# Patient Record
Sex: MALE | Race: WHITE | Employment: OTHER | URBAN - METROPOLITAN AREA
[De-identification: names, ages, dates, MRNs, and addresses within clinical notes are randomized per-mention and may not be internally consistent; named-entity substitution may affect disease eponyms.]

---

## 2017-11-01 ENCOUNTER — HOSPITAL ENCOUNTER (OUTPATIENT)
Age: 52
Setting detail: OUTPATIENT SURGERY
Discharge: HOME | End: 2017-11-01
Attending: SURGERY | Admitting: SURGERY
Payer: COMMERCIAL

## 2017-11-01 VITALS
DIASTOLIC BLOOD PRESSURE: 89 MMHG | HEIGHT: 69 IN | BODY MASS INDEX: 27.56 KG/M2 | HEART RATE: 65 BPM | WEIGHT: 186.07 LBS | OXYGEN SATURATION: 98 % | TEMPERATURE: 96.8 F | SYSTOLIC BLOOD PRESSURE: 127 MMHG | RESPIRATION RATE: 16 BRPM

## 2017-11-01 PROBLEM — K40.90 LEFT INGUINAL HERNIA: Status: ACTIVE | Noted: 2017-11-01

## 2017-11-01 PROCEDURE — 6360000002 HC RX W HCPCS: Performed by: SURGERY

## 2017-11-01 PROCEDURE — 99153 MOD SED SAME PHYS/QHP EA: CPT | Performed by: SURGERY

## 2017-11-01 PROCEDURE — C1781 MESH (IMPLANTABLE): HCPCS | Performed by: SURGERY

## 2017-11-01 PROCEDURE — 3600000012 HC SURGERY LEVEL 2 ADDTL 15MIN: Performed by: SURGERY

## 2017-11-01 PROCEDURE — 3600000002 HC SURGERY LEVEL 2 BASE: Performed by: SURGERY

## 2017-11-01 PROCEDURE — 7100000010 HC PHASE II RECOVERY - FIRST 15 MIN: Performed by: SURGERY

## 2017-11-01 PROCEDURE — 2500000003 HC RX 250 WO HCPCS: Performed by: SURGERY

## 2017-11-01 PROCEDURE — 7100000011 HC PHASE II RECOVERY - ADDTL 15 MIN: Performed by: SURGERY

## 2017-11-01 PROCEDURE — 2580000003 HC RX 258: Performed by: SURGERY

## 2017-11-01 PROCEDURE — 99152 MOD SED SAME PHYS/QHP 5/>YRS: CPT | Performed by: SURGERY

## 2017-11-01 DEVICE — MESH SURG W4XL6IN STD FOR INGUINAL HERN REP PROLITE ULT: Type: IMPLANTABLE DEVICE | Site: GROIN | Status: FUNCTIONAL

## 2017-11-01 RX ORDER — OXYCODONE HYDROCHLORIDE AND ACETAMINOPHEN 5; 325 MG/1; MG/1
TABLET ORAL
Qty: 20 TABLET | Refills: 0
Start: 2017-11-01 | End: 2021-09-27

## 2017-11-01 RX ORDER — FENTANYL CITRATE 50 UG/ML
INJECTION, SOLUTION INTRAMUSCULAR; INTRAVENOUS PRN
Status: DISCONTINUED | OUTPATIENT
Start: 2017-11-01 | End: 2017-11-01 | Stop reason: HOSPADM

## 2017-11-01 RX ORDER — KETOROLAC TROMETHAMINE 15 MG/ML
15 INJECTION, SOLUTION INTRAMUSCULAR; INTRAVENOUS ONCE
Status: COMPLETED | OUTPATIENT
Start: 2017-11-01 | End: 2017-11-01

## 2017-11-01 RX ORDER — SODIUM CHLORIDE, SODIUM LACTATE, POTASSIUM CHLORIDE, CALCIUM CHLORIDE 600; 310; 30; 20 MG/100ML; MG/100ML; MG/100ML; MG/100ML
INJECTION, SOLUTION INTRAVENOUS CONTINUOUS
Status: DISCONTINUED | OUTPATIENT
Start: 2017-11-01 | End: 2017-11-01 | Stop reason: HOSPADM

## 2017-11-01 RX ORDER — MIDAZOLAM HYDROCHLORIDE 1 MG/ML
INJECTION INTRAMUSCULAR; INTRAVENOUS PRN
Status: DISCONTINUED | OUTPATIENT
Start: 2017-11-01 | End: 2017-11-01 | Stop reason: HOSPADM

## 2017-11-01 RX ORDER — M-VIT,TX,IRON,MINS/CALC/FOLIC 27MG-0.4MG
1 TABLET ORAL DAILY
COMMUNITY

## 2017-11-01 RX ADMIN — SODIUM CHLORIDE, POTASSIUM CHLORIDE, SODIUM LACTATE AND CALCIUM CHLORIDE: 600; 310; 30; 20 INJECTION, SOLUTION INTRAVENOUS at 06:49

## 2017-11-01 RX ADMIN — KETOROLAC TROMETHAMINE 15 MG: 15 INJECTION, SOLUTION INTRAMUSCULAR; INTRAVENOUS at 09:17

## 2017-11-01 RX ADMIN — CEFAZOLIN SODIUM 2 G: 2 SOLUTION INTRAVENOUS at 07:42

## 2017-11-01 ASSESSMENT — PAIN SCALES - GENERAL
PAINLEVEL_OUTOF10: 0

## 2017-11-01 NOTE — OP NOTE
sharply free from the vas and vessels and inverted back into the preperitoneal space without ligation or division. The surrounding preperitoneal fat was excised. The internal ring was estimated at 3 cm in size, admitting two fingers snugly. .  The floor of the canal was intact. A pre-peritoneal mesh repair was performed. Preparatory to this, the cord was skeletonized, dividing the cremasteric fascia with the cautery and the external spermatic vessels and the accompanying genital branch of the genitofemoral nerve between hemostats and ties with 3-0 Vicryl. The floor of the canal was opened from the inferior epigastric vessels down to the pubic tubercle sharply and the entire myopectineal orifice was then dissected working through the floor of the canal using a combination of blunt and sharp dissection and judicious use of the cautery for hemostasis. The iliohypogastric nerve was not identified. The repair was then performed by placing a 4 x 6 inch piece of ProLite Ultra Mesh into the preperitoneal space. This was anchored near the pubic tubercle with a mattress suture of 2-0 Prolene. Laterally, a slit was placed in this mesh and the 2 tails were passed around the cord and reapproximated with 2-0 PDS. This lateral portion of the mesh then was passed over the inferior epigastric vessels and back into the preperitoneal space through the internal ring. The mesh and the wound were then irrigated with saline solution containing gentamicin. The floor of the canal was  closed over the mesh after the manner of a Shouldice repair.    Starting with the 2-0 Prolene previously placed a running imbricating layer of transversalis fascia medially  was sutured to the iliopubic tract laterally, coming out to the internal ring until a tight closure had been performed, then reversing the suture and suturing the free edge that was thus created to the shelving portion of the inguinal ligament, running down to the pubic tubercle and tying. A 2nd suture of 2-0 Prolene was then started up near the internal ring, suturing the internal oblique to the shelving portion of the inguinal ligament, running down to the pubic tubercle, reversing the suture and coming back up to the internal ring and tying. The cord and ilioinguinal nerve were placed back into the canal and then after further irrigations with the saline and gentamicin solution, the external oblique was closed over the cord with running suture of 2-0 PDS. Peter's fascia was closed with interrupted sutures of 3-0 Monocryl and the skin was closed with a combination of interrupted subcuticular sutures of 4-0 Monocryl and dermal adhesive. Patient tolerated the procedure well and was transferred to the postoperative care area stable and in good condition with plans to be discharged to home later the same day. The patient did receive appropriate prophylactic antibiotics before the operation and does not require any after the operation. The patient was treated for VTE prophylaxis with IPC cuffs and does not require VTE prophylaxis after the operation.     Electronically signed by Yojana Conner MD on 11/1/2017 at 8:59 AM

## 2019-01-17 ENCOUNTER — CONSULT (OUTPATIENT)
Dept: CARDIOLOGY | Facility: CLINIC | Age: 54
End: 2019-01-17

## 2019-01-17 VITALS
HEART RATE: 69 BPM | BODY MASS INDEX: 26.22 KG/M2 | DIASTOLIC BLOOD PRESSURE: 81 MMHG | HEIGHT: 69 IN | WEIGHT: 177 LBS | SYSTOLIC BLOOD PRESSURE: 129 MMHG

## 2019-01-17 DIAGNOSIS — R55 NEAR SYNCOPE: ICD-10-CM

## 2019-01-17 DIAGNOSIS — R00.0 TACHYCARDIA, UNSPECIFIED: Primary | ICD-10-CM

## 2019-01-17 DIAGNOSIS — Z82.49 FAMILY HISTORY OF LONG QT SYNDROME: ICD-10-CM

## 2019-01-17 DIAGNOSIS — R00.0 TACHYCARDIA: Primary | ICD-10-CM

## 2019-01-17 PROCEDURE — 99204 OFFICE O/P NEW MOD 45 MIN: CPT | Performed by: INTERNAL MEDICINE

## 2019-01-17 PROCEDURE — 93000 ELECTROCARDIOGRAM COMPLETE: CPT | Performed by: INTERNAL MEDICINE

## 2019-01-17 RX ORDER — METOPROLOL SUCCINATE 25 MG/1
25 TABLET, EXTENDED RELEASE ORAL DAILY
Qty: 30 TABLET | Refills: 6 | Status: SHIPPED | OUTPATIENT
Start: 2019-01-17 | End: 2019-04-18

## 2019-01-17 NOTE — PROGRESS NOTES
Electrophysiology Consult     Rachid Jaramillo  1965  [unfilled]  [unfilled]    01/17/19    DATE OF ADMISSION: (Not on file)  [facility] CARDIOLOGY    Provider, No Known  [facility] [address]    Chief Complaint   Patient presents with   • Palpitations     Consult     Problem List:  1. Palpitations  a. 48 Hour Holter 6/25/18: 47 127 bpm, Average 77 bpm. Sinus Tachycarida 127 bpm. Rare PVCs. No symtpoms during monitor.   2. Family History of LQTS (sister)   3. Surgical History  a. inguinal hernia left repair          History of Present Illness:   53 year old WM with family history of LQTS in history sister who presents for consultation for palpitations. For the past 20 years, he has had episodes of palpitations occurring 4 times per year. More recently, he has had more frequent episodes. Episodes are described sudden onset tachycardia that lasts several minutes, leaving him feel weak several hours after. During the episodes, he just feels weak but no chest pain or SOB or syncope. He also has episodes of presyncope unrelated to his tachycardia. He feels lightheaded all of a sudden, no triggering factors, lasts a few seconds, does not actually pass out. He puts his head down and it passes on its own. He has been having these episodes for the multiple years, with approximately 6 episodes over the past year. Not related to exertion or being overheated. He does donate blood and these episodes do not occur. No history of cardiac testing. No LHC, echocardiogram, stress test. No genetic testing. Never been told his EKG had Long QT. No tobacco. No ETOH. One cup of coffee daily. No illegal drugs or stimulants.  He denies any active chest pain or shortness of breath during exertional activities.    No Known Allergies     Cannot display prior to admission medications because the patient has not been admitted in this contact.          No current outpatient medications on  "file.    Social History     Socioeconomic History   • Marital status:      Spouse name: Not on file   • Number of children: Not on file   • Years of education: Not on file   • Highest education level: Not on file   Tobacco Use   • Smoking status: Never Smoker   • Smokeless tobacco: Never Used   Substance and Sexual Activity   • Alcohol use: No     Frequency: Never   • Drug use: No   • Sexual activity: Defer       Sister: long QTc syndrome  Nephew: Long QTc syndrome        REVIEW OF SYSTEMS:   CONST:  No weight loss, fever, chills, weakness or + fatigue.   HEENT:  No visual loss, blurred vision, double vision, yellow sclerae.                   No hearing loss, congestion, sore throat.   SKIN:      No rashes, urticaria, ulcers, sores.     RESP:     No shortness of breath, hemoptysis, cough, sputum.   GI:           No anorexia, nausea, vomiting, diarrhea. No abdominal pain, melena.   :         No burning on urination, hematuria or increased frequency.  ENDO:    No diaphoresis, cold or heat intolerance. No polyuria or polydipsia.   NEURO:  No headache, + dizziness, +presyncope, - paralysis, ataxia, or parasthesias.                  No change in bowel or bladder control. No history of CVA/TIA  MUSC:    No muscle, back pain, joint pain or stiffness.   HEME:    No anemia, bleeding, bruising. No history of DVT/PE.  PSYCH:  No history of depression, anxiety    Vitals:    01/17/19 1336   BP: 129/81   BP Location: Left arm   Patient Position: Sitting   Pulse: 69   Weight: 80.3 kg (177 lb)   Height: 175.3 cm (69\")                 Physical Exam:  GEN: Well nourished, well-developed, no acute distress  HEENT: Normocephalic, atraumatic, PERRLA, moist mucous membranes  NECK: Supple, NO JVD, no thyromegaly, no lymphadenopathy  CARD: S1S2, RRR, no murmur, gallop, rub. PMI NL  LUNGS: Clear to auscultation, normal respiratory effort  ABDOMEN: Soft, nontender, normal bowel sounds  EXTREMITIES: No gross deformities, no clubbing, " cyanosis, or edema  SKIN: Warm, dry, intact, no lesions  NEURO: No focal deficits, alert and oriented x 3  PSYCHIATRIC: Normal affect and mood      I personally viewed and interpreted the patient's EKG/Telemetry/lab data      ECG 12 Lead  Date/Time: 1/17/2019 2:24 PM  Performed by: Anand Welsh MD  Authorized by: Anand Welsh MD   Comparison: compared with previous ECG   Comparison to previous ECG: QTc WNL  Rhythm: sinus rhythm  BPM: 79                ICD-10-CM ICD-9-CM   1. Tachycardia, unspecified R00.0 785.0   2. Family history of long QT syndrome Z82.49 V17.49   3. Near syncope R55 780.2       Assessment and Plan:     1. Palpitations:  - clinically, possibly consistent with SVT. 48 hour Holter does not show any significant arrhythmias, but pt did not have symptoms during monitor.  add Toprol XL 25 mg qhs.  If continues to have breakthrough episodes, will then consider a longer period of recording such as a ziopatch or MCOT monitor    2. Family History of LQTS:  - EKGs and telemetry Holter monitor strips reviewed show normal QT and QTC levels.  - It is unclear if genetic testing was family has been performed.  We will monitor for now based on his symptoms and QT intervals.    3. Presyncope: Does have a prodrome arm of lightheadedness prior to these episodes which may represent vasovagal syncope however will need echocardiogram to assess LV size and function and rule out any structural abnormalities.      Scribed for Anand Welsh MD by Anne Marie Kiran PA-C. 1/17/2019  2:28 PM     IAnand MD, personally performed the services described in this documentation as scribed by the above named individual in my presence, and it is both accurate and complete.  1/17/2019  2:38 PM

## 2019-04-18 ENCOUNTER — OFFICE VISIT (OUTPATIENT)
Dept: CARDIOLOGY | Facility: CLINIC | Age: 54
End: 2019-04-18

## 2019-04-18 VITALS
WEIGHT: 175 LBS | HEART RATE: 78 BPM | BODY MASS INDEX: 25.92 KG/M2 | HEIGHT: 69 IN | SYSTOLIC BLOOD PRESSURE: 108 MMHG | DIASTOLIC BLOOD PRESSURE: 78 MMHG

## 2019-04-18 DIAGNOSIS — R55 SYNCOPE, NEAR: ICD-10-CM

## 2019-04-18 DIAGNOSIS — R00.0 TACHYCARDIA, UNSPECIFIED: Primary | ICD-10-CM

## 2019-04-18 DIAGNOSIS — Z82.49 FAMILY HISTORY OF LONG QT SYNDROME: ICD-10-CM

## 2019-04-18 PROCEDURE — 93000 ELECTROCARDIOGRAM COMPLETE: CPT | Performed by: PHYSICIAN ASSISTANT

## 2019-04-18 PROCEDURE — 99213 OFFICE O/P EST LOW 20 MIN: CPT | Performed by: PHYSICIAN ASSISTANT

## 2019-04-18 NOTE — PROGRESS NOTES
"Rachid Vannesakevin  1965  [phone]    04/18/2019    [facility] CARDIOLOGY     Provider, No Known  [facility] [id number]    Chief Complaint   Patient presents with   • Rapid Heart Rate       Problem List:  1. Palpitations  a. 48 Hour Holter 6/25/18: 47 127 bpm, Average 77 bpm. Sinus Tachycarida 127 bpm. Rare PVCs. No symtpoms during monitor.   b. Echocardiogram 1/28/19: EF 60-65%, no valvular abnormalities  2. Family History of LQTS (sister)   3. Surgical History  a. inguinal hernia left repair    Allergies  No Known Allergies    Current Medications  No current outpatient medications on file.    History of Present Illness     Pt presents for follow up of palpitations, presyncope, and family history of LQTS.   Since we last saw the pt, he has not had any further episodes of palpitations or presyncope. He has not been taking his Metoprolol that Dr. Welsh prescribed at his last visit. He states he was hesitant to start taking it. He denies any CP or SOB. No hospitalizations or ER visits. He is active and doing well. He had his echocardiogram in January which showed normal EF. He has been doing well overall.     ROS:  General:  Denies fatigue, weight gain or loss  Cardiovascular:  Denies CP, PND, syncope, near syncope, edema or palpitations.  Pulmonary:  Denies MONTES, cough, or wheezing      Vitals:    04/18/19 1248   BP: 108/78   BP Location: Left arm   Patient Position: Sitting   Pulse: 78   Weight: 79.4 kg (175 lb)   Height: 175.3 cm (69\")     Body mass index is 25.84 kg/m².  PE:  General: NAD. A & O x 3  Neck: no JVD, no carotid bruits, no TM  Heart RRR, NL S1, S2, S4 present, no rubs, murmurs  Lungs: CTA, no wheezes, rhonchi, or rales  Abd: soft, non-tender, NL BS  Ext: No musculoskeletal deformities, no edema, cyanosis, or clubbing  Psych: normal mood and affect    Diagnostic Data:        ECG 12 Lead  Date/Time: 4/18/2019 1:19 PM  Performed by: Anne Marie Kiran" PA  Authorized by: Anne Marie Kiran PA   Comparison: compared with previous ECG from 1/17/2019  Comparison to previous ECG: QTc 460 ms now, compared with 440 ms on last EKG  Rhythm: sinus rhythm  BPM: 80              1. Tachycardia, unspecified    2. Family history of long QT syndrome    3. Syncope, near          Plan:  1. Palpitations:  - no further episodes. Consider MCOT if he has further episodes.     2. Family History of Long QT Syndrome:  - EKG shows QTc 460 ms today.  Will discuss with Dr. Welsh if he needs to pursue genetic testing at this time. I will call the patient back after discussion with Dr. Welsh.   - He should start taking Metoprolol 25 mg prescribed at last office visit.     3. Presyncope:  - no recurrences. Doing well.       F/up in 6 months

## 2019-04-24 ENCOUNTER — TELEPHONE (OUTPATIENT)
Dept: CARDIOLOGY | Facility: CLINIC | Age: 54
End: 2019-04-24

## 2019-04-24 ENCOUNTER — DOCUMENTATION (OUTPATIENT)
Dept: CARDIOLOGY | Facility: CLINIC | Age: 54
End: 2019-04-24

## 2019-04-24 NOTE — PROGRESS NOTES
Talked with Dr. Welsh about his EKG with borderline prolonged QTc. He recommends continuing his beta blocker and order genetic testing.

## 2019-04-24 NOTE — TELEPHONE ENCOUNTER
----- Message from DARCI Treviño sent at 4/24/2019  9:10 AM EDT -----  Regarding: genetic testing  Please call this patient and tell him that [facility] recommends genetic testing due to borderline prolonged QT and family history of long QT syndrome. Tell him to take his Lopressor that was prescribed as well.   Thank you!

## 2019-04-26 ENCOUNTER — TELEPHONE (OUTPATIENT)
Dept: CARDIOLOGY | Facility: CLINIC | Age: 54
End: 2019-04-26

## 2019-04-26 DIAGNOSIS — Z82.49 FH: LONG QT SYNDROME: Primary | ICD-10-CM

## 2019-05-08 ENCOUNTER — TELEPHONE (OUTPATIENT)
Dept: GENETICS | Facility: HOSPITAL | Age: 54
End: 2019-05-08

## 2019-05-29 ENCOUNTER — APPOINTMENT (OUTPATIENT)
Dept: LAB | Facility: HOSPITAL | Age: 54
End: 2019-05-29

## 2019-05-29 ENCOUNTER — CLINICAL SUPPORT (OUTPATIENT)
Dept: GENETICS | Facility: HOSPITAL | Age: 54
End: 2019-05-29

## 2019-05-29 DIAGNOSIS — Z87.898 HISTORY OF PALPITATIONS: ICD-10-CM

## 2019-05-29 DIAGNOSIS — Z13.79 GENETIC TESTING: Primary | ICD-10-CM

## 2019-05-29 DIAGNOSIS — Z82.41 FAMILY HISTORY OF SUDDEN CARDIAC DEATH: ICD-10-CM

## 2019-05-29 DIAGNOSIS — Z82.49 FAMILY HISTORY OF LONG QT SYNDROME: ICD-10-CM

## 2019-05-29 DIAGNOSIS — R94.31 PROLONGED QT INTERVAL: Primary | ICD-10-CM

## 2019-05-29 PROCEDURE — 96040: CPT | Performed by: GENETIC COUNSELOR, MS

## 2019-05-30 NOTE — PROGRESS NOTES
Rachid Jaramillo, a 54-year old male was seen for genetic counseling due to a personal history of borderline prolonged QT interval and family history of Long QT syndrome (LQTS). He has a history of palpitations and pre-syncope. He opted to pursue testing for LQTS. Genetic testing was ordered via the [facility] panel, which analyzes 64 genes associated with inherited arrhythmias including LQTS. Results are expected in 3-4 weeks.    CARDIAC FAMILY HISTORY: (See attached pedigree)     Sister:  LQTS, dx. early 30s  Brother: Borderline QT interval  Nephew: LQTS, dx. childhood  Niece:  LQTS, dx. childhood  Brother: Death from drowning [place] a lake at age 18    We do not have medical records regarding the diagnoses in the family.    RISK ASSESSMENT:   Mr. Jaramillo’s personal history of borderline prolonged QT interval and family history warranted genetic testing. Approximately 75% of individuals with Long QT syndrome have a mutation in one of the genes that was analyzed, therefore negative testing cannot completely rule out a diagnosis of Long QT syndrome.    GENETIC COUNSELING: (30 minutes) We began by discussing the features of LQTS.  A prolonged QT interval is most often due to inherited long QT syndrome (LQTS), but can also be induced by various drug exposures, hypokalemia (low potassium) and structural heart defects.  Determining the etiology greatly impacts the medical management and counseling regarding risk to the patient and their family members.  Long QT Syndrome (LQTS) is a common cardiac channelopathy, affecting approximately 1/5,000 individuals in the [place] with 500-1,000 new carriers born each year. Individuals with LQTS are at risk for syncopal events, ventricular fibrillation and aborted cardiac arrest (if the individual is defibrillated) or sudden death.  While there is no cure, proper medical management including combinations of lifestyle modification, â-blocker therapy and implantable  cardioverter-defibrillators (ICDs) can reduce the risks.    Familial LQTS is diagnosed with a combination of measurement of the QT interval on ECG and family history.  The most common type of familial LQTS, also called Ray-Self syndrome, is inherited in an autosomal dominant manner, meaning a single non-working copy of the gene is enough to cause the condition.  The gene may be passed from either the mother or father to either sons or daughters. Children of an individual who has Ray-Self syndrome have a 50% chance of inheriting the disease causing gene.  Familial LQTS displays incomplete penetrance with variable expressivity, meaning that not all individuals who inherit the disease causing gene in a family will display symptoms of the condition and that the symptoms can vary within a family.  Approximately 1/3 of individuals with familial LQTS remain asymptomatic.        A resting ECG is used to diagnose affected individuals within in a family; however, the QT interval on ECG can be indeterminate in 30% of affected individuals.  Additionally, a normal resting ECG has been observed in a few individuals (<<1%) that were known to have Ray-Self syndrome.  LQTS is caused by mutations in at least twelve different genes and clinical genetic testing is available.  Genetic testing identifies a mutation in approximately 75% of patients diagnosed with familial LQTS.  Familial LQTS syndrome demonstrates strong genotype/phenotype correlations, meaning the condition behaves differently depending on the gene responsible.  Genetic testing can help determine not only the risk of a cardiac event, but also the type of trigger that causes the cardiac event (loud noises and the startle response versus exercise versus sleep, etc.).  Additionally, the identification of a mutation would allow other family members to undergo informative genetic testing to assess their risk for LQTS.    GENETIC TESTING: A next generation sequencing  and deletion/duplication panel of 64 genes that predispose to arrhythmogenic right ventricular dysplasia (ARVD), Brugada syndrome, catecholaminergic polymorphic ventricular tachycardia (CPVT), long QT syndrome (LQTS), short QT syndrome (SQTS), and other arrhythmias/channelopathies was ordered. The features of these diseases and the associated genes overlap, therefore utilizing a comprehensive panel that includes relevant arrhythmia genes increases the likelihood of identifying a causative and informative genetic mutation.  The risks, benefits, and limitations of genetic testing and implications for clinical management following testing were reviewed. DNA test results can influence decisions regarding screening, prevention, and surgical management. Genetic testing can have significantly psychological implications for both individuals and families. Also discussed was the possibility of employment and insurance discrimination based on genetic test results and the laws in place to prevent this ([place]). We discussed panel testing and the possibility that, in some cases, genetic test results may be ambiguous due to the identification of a genetic variant.    PLAN:  Results will be available in 3-4 weeks, and Mr. Jaramillo will be contacted at that time.  We encourage Mr. Jaramillo to contact us in the meantime if he has any questions or concerns.        Jadyn Rivera MS, [facility], [facility]  Licensed Certified Genetic Counselor

## 2019-06-18 ENCOUNTER — DOCUMENTATION (OUTPATIENT)
Dept: GENETICS | Facility: HOSPITAL | Age: 54
End: 2019-06-18

## 2019-06-18 NOTE — PROGRESS NOTES
Rachid Jaramillo, a 54-year old male was seen for genetic counseling due to a personal history of borderline prolonged QT interval and family history of Long QT syndrome (LQTS). He has a history of palpitations and pre-syncope. He opted to pursue testing for LQTS. Genetic testing was ordered via the [employer] panel, which analyzes 64 genes associated with inherited arrhythmias including LQTS. 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   These results were discussed with Mr. Jaramillo by telephone on 6/18/19.      CARDIAC FAMILY HISTORY: (See attached pedigree)     Sister:  LQTS, dx. early 30s  Brother: Borderline QT interval  Nephew: LQTS, dx. childhood  Niece:  LQTS, dx. childhood  Brother: Death from drowning [place] a lake at age 18    We do not have medical records regarding the diagnoses in the family.    RISK ASSESSMENT:   Mr. Jaramillo’s personal history of borderline prolonged QT interval and family history warranted genetic testing. Approximately 75% of individuals with Long QT syndrome have a mutation in one of the genes that was analyzed, therefore negative testing cannot completely rule out a diagnosis of Long QT syndrome.    GENETIC COUNSELING:  We began by discussing the features of LQTS.  A prolonged QT interval is most often due to inherited long QT syndrome (LQTS), but can also be induced by various drug exposures, hypokalemia (low potassium) and structural heart defects.  Determining the etiology greatly impacts the medical management and counseling regarding risk to the patient and their family members.  Long QT Syndrome (LQTS) is a common cardiac channelopathy, affecting approximately 1/5,000 individuals in the [place] with 500-1,000 new carriers born each year. Individuals with LQTS are at risk for syncopal events, ventricular fibrillation and aborted cardiac arrest (if the individual is defibrillated) or sudden death.  While there is no  cure, proper medical management including combinations of lifestyle modification, â-blocker therapy and implantable cardioverter-defibrillators (ICDs) can reduce the risks.    Familial LQTS is diagnosed with a combination of measurement of the QT interval on ECG and family history.  The most common type of familial LQTS, also called Ray-Self syndrome, is inherited in an autosomal dominant manner, meaning a single non-working copy of the gene is enough to cause the condition.  The gene may be passed from either the mother or father to either sons or daughters. Children of an individual who has Ray-Self syndrome have a 50% chance of inheriting the disease causing gene.  Familial LQTS displays incomplete penetrance with variable expressivity, meaning that not all individuals who inherit the disease causing gene in a family will display symptoms of the condition and that the symptoms can vary within a family.  Approximately 1/3 of individuals with familial LQTS remain asymptomatic.        A resting ECG is used to diagnose affected individuals within a family; however, the QT interval on ECG can be indeterminate in 30% of affected individuals.  Additionally, a normal resting ECG has been observed in a few individuals (<<1%) that were known to have Ray-Self syndrome.  LQTS is caused by mutations in at least twelve different genes and clinical genetic testing is available.  Genetic testing identifies a mutation in approximately 75% of patients diagnosed with familial LQTS.  Familial LQTS syndrome demonstrates strong genotype/phenotype correlations, meaning the condition behaves differently depending on the gene responsible.  Genetic testing can help determine not only the risk of a cardiac event, but also the type of trigger that causes the cardiac event (loud noises and the startle response versus exercise versus sleep, etc.).  Additionally, the identification of a mutation would allow other family members to  undergo informative genetic testing to assess their risk for LQTS.    GENETIC TESTING: A next generation sequencing and deletion/duplication panel of 64 genes that predispose to arrhythmogenic right ventricular dysplasia (ARVD), Brugada syndrome, catecholaminergic polymorphic ventricular tachycardia (CPVT), long QT syndrome (LQTS), short QT syndrome (SQTS), and other arrhythmias/channelopathies was ordered. The features of these diseases and the associated genes overlap; therefore utilizing a comprehensive panel that includes relevant arrhythmia genes increases the likelihood of identifying a causative and informative genetic mutation.  The risks, benefits, and limitations of genetic testing and implications for clinical management following testing were reviewed. DNA test results can influence decisions regarding screening, prevention, and surgical management. Genetic testing can have significantly psychological implications for both individuals and families. Also discussed was the possibility of employment and insurance discrimination based on genetic test results and the laws in place to prevent this (EZEQUIEL), as well as the limitations of these laws. We discussed panel testing and the possibility that, in some cases, genetic test results may be ambiguous due to the identification of a genetic variant of uncertain significance.     TEST RESULTS:  A pathogenic mutation was identified in the KCNQ1 gene, which is causative for Long QT Syndrome Type 1. Based on the family history, it is unclear from which side of the family this mutation was inherited. Mr. Jaramillo’s children and siblings each have a 50% chance to have inherited this mutation and be at increased risk, therefore genetic testing is warranted for at-risk family members.     At-risk family members should be evaluated by cardiology, unless they have been proven to not carry this KCNQ1 mutation. We would be happy to see family members who live in the [place]  area in our clinic to further discuss this information and testing options. They can call our office at [phone] to schedule an appointment. For family members who live elsewhere, there are genetic counselors at most [facility] centers. They can find a genetic counselor by visiting the American Board of Genetic Counseling website at www.abgc.net or they can call our office and we would be happy to give them the contact information of the closest genetic counselor.  Mr. Jaramillo would need to provide his family members with a copy of his test results to ensure the correct testing is ordered. [facility], the laboratory that testing was completed through, offers single site testing for the known familial mutation to close relatives within 90 days of Mr. Jaramillo’s result report at no cost to the patient (testing by 9/11/19).     PLAN: Genetic counseling remains available to Mr. Jaramillo and his family. He will follow up with Dr. Welsh to establish a plan for management. If Mr. Jaramillo has any questions or concerns, he is welcome to contact us at [phone].      Jadyn Rivera MS, [facility] [place], [facility]  Licensed Certified Genetic Counselor       Cc: MD Rachid Mendez

## 2019-06-21 ENCOUNTER — TELEPHONE (OUTPATIENT)
Dept: CARDIOLOGY | Facility: CLINIC | Age: 54
End: 2019-06-21

## 2019-06-21 RX ORDER — NADOLOL 40 MG/1
40 TABLET ORAL DAILY
Qty: 30 TABLET | Refills: 11 | Status: SHIPPED | OUTPATIENT
Start: 2019-06-21 | End: 2020-07-01 | Stop reason: SDUPTHER

## 2019-06-21 NOTE — TELEPHONE ENCOUNTER
----- Message from Anand Welsh MD sent at 6/20/2019  6:29 PM EDT -----   He tested positive for long QT syndrome 1.  Please call him and make sure that he knows that he needs to be aggressive with any fever to get down as quickly as possible.  Also give him the website [email or web address] so that he knows any drugs that may worsen his QT interval.  Also I would like to stop his Toprol and initiate naldolol 40 mg po daily    GT    ----- Message -----  From: Jadyn Rivera  Sent: 6/20/2019  12:54 PM  To: Anand Welsh MD

## 2019-06-21 NOTE — TELEPHONE ENCOUNTER
Pt called back, I explained about quickly reducing a fever, gave him the website to check any medications and any potential new medications, and switching from toprol to nadolol.  Verbal understanding.

## 2019-08-15 ENCOUNTER — OFFICE VISIT (OUTPATIENT)
Dept: CARDIOLOGY | Facility: CLINIC | Age: 54
End: 2019-08-15

## 2019-08-15 VITALS
DIASTOLIC BLOOD PRESSURE: 72 MMHG | SYSTOLIC BLOOD PRESSURE: 102 MMHG | BODY MASS INDEX: 26.66 KG/M2 | HEART RATE: 58 BPM | WEIGHT: 180 LBS | HEIGHT: 69 IN

## 2019-08-15 DIAGNOSIS — R42 POSTURAL DIZZINESS WITH PRESYNCOPE: Primary | ICD-10-CM

## 2019-08-15 DIAGNOSIS — R55 POSTURAL DIZZINESS WITH PRESYNCOPE: Primary | ICD-10-CM

## 2019-08-15 DIAGNOSIS — I45.81 LONG Q-T SYNDROME: Primary | ICD-10-CM

## 2019-08-15 PROCEDURE — 93000 ELECTROCARDIOGRAM COMPLETE: CPT | Performed by: PHYSICIAN ASSISTANT

## 2019-08-15 PROCEDURE — 99213 OFFICE O/P EST LOW 20 MIN: CPT | Performed by: PHYSICIAN ASSISTANT

## 2019-08-15 NOTE — PROGRESS NOTES
Rachid Jaramillo  1965  [phone]    08/15/2019    [facility] CARDIOLOGY     Provider, No Known  [facility] [address]    Chief Complaint   Patient presents with   • Rapid Heart Rate       Problem List:  1. Long QT Syndrome  a. 48 Hour Holter 6/25/18: 47 127 bpm, Average 77 bpm. Sinus Tachycarida 127 bpm. Rare PVCs. No symtpoms during monitor.   b. Echocardiogram 1/28/19: EF 60-65%, no valvular abnormalities  c. Positive Genetic Testing for LQTS Type 1  2. Family History of LQTS (sister)   3. Surgical History  a. inguinal hernia left repair        Allergies  No Known Allergies    Current Medications    Current Outpatient Medications:   •  nadolol (CORGARD) 40 MG tablet, Take 1 tablet by mouth Daily. AT BEDTIME, Disp: 30 tablet, Rfl: 11    History of Present Illness     Pt presents for follow up of long QT syndrome. Since we last saw the pt, tested positive on QT syndrome type I.  Dr. Welsh recommended nadolol, which he is taking.  Since being on the nadolol he has had recurrent episodes of presyncope 1-2 times per month which he describes as a feeling of lightheadedness and blackened vision with near syncope.  He has not had a full syncope episode.  He states there are no triggering factors and usually this occurs while sitting.  He does not feel any tachypalpitations shortness of breath or chest pain during or after these events.  He has had these episodes for many years, but notes they are worse since starting nadolol.  Overall he feels he is doing well and denies any chest pain, shortness of breath, palpitations.  Denies any hospitalizations, ER visits, bleeding, or TIA/CVA symptoms. Overall feels well.  He states that since he tested positive, now his daughter and several of her cousins have been tested all of which have been positive aside from one family member.    ROS:  General:  Denies fatigue, weight gain or loss  Cardiovascular:  Denies CP, PND,  "syncope, + near syncope, edema or palpitations.  Pulmonary:  Denies MONTES, cough, or wheezing      Vitals:    08/15/19 1233   BP: 102/72   BP Location: Left arm   Patient Position: Sitting   Pulse: 58   Weight: 81.6 kg (180 lb)   Height: 175.3 cm (69\")     Body mass index is 26.58 kg/m².  PE:  General: NAD  Neck: no JVD, no carotid bruits, no TM  Heart RRR, NL S1, S2, S4 present, no rubs, murmurs  Lungs: CTA, no wheezes, rhonchi, or rales  Abd: soft, non-tender, NL BS  Ext: No musculoskeletal deformities, no edema, cyanosis, or clubbing  Psych: normal mood and affect    Diagnostic Data:        ECG 12 Lead  Date/Time: 8/15/2019 1:00 PM  Performed by: Anne Marie Kiran PA  Authorized by: Anne Marie Kiran PA   Comparison: compared with previous ECG from 4/18/2019  Similar to previous ECG  Comparison to previous ECG: QTc has shortened  Rhythm: sinus rhythm  BPM: 57              1. Long Q-T syndrome          Plan:    1.  Long QT syndrome:  -Genetic testing for long QT syndrome type I.  Currently on nadolol 40 mg daily, he was told to continue this.  - symptoms of presyncope but not full syncope.  Presyncope symptoms are somewhat concerning in light of his diagnosis of long QT syndrome, however could be related to vasovagal presyncope as he said it has worsened since being on nadolol with lower blood pressures.  However, I would like for him to wear an MCOT to rule out ventricular arrhythmias.     F/up in 6 months    Electronically signed by DARCI Choudhary, 08/15/19, 1:03 PM.    "

## 2019-08-22 ENCOUNTER — TELEPHONE (OUTPATIENT)
Dept: CARDIOLOGY | Facility: CLINIC | Age: 54
End: 2019-08-22

## 2019-08-22 NOTE — TELEPHONE ENCOUNTER
Left message for patient to call us back as he did not answer. Discussed plan from my office visit with Mr. Jaramillo last week with Dr. Welsh, and he agrees to proceed with MCOT at this time. No other recommendations or changes in medications at this time.

## 2019-10-18 ENCOUNTER — TELEPHONE (OUTPATIENT)
Dept: CARDIOLOGY | Facility: CLINIC | Age: 54
End: 2019-10-18

## 2019-10-18 NOTE — TELEPHONE ENCOUNTER
I called pt to let him know that his monitor results were normal (no AF, no VT/VF) and that no changes were needed at this time per Anne Marie Kiran PA-C.

## 2020-02-20 ENCOUNTER — OFFICE VISIT (OUTPATIENT)
Dept: CARDIOLOGY | Facility: CLINIC | Age: 55
End: 2020-02-20

## 2020-02-20 VITALS
DIASTOLIC BLOOD PRESSURE: 72 MMHG | WEIGHT: 180 LBS | HEIGHT: 69 IN | BODY MASS INDEX: 26.66 KG/M2 | HEART RATE: 58 BPM | SYSTOLIC BLOOD PRESSURE: 108 MMHG

## 2020-02-20 DIAGNOSIS — I45.81 LONG Q-T SYNDROME: Primary | ICD-10-CM

## 2020-02-20 PROCEDURE — 99213 OFFICE O/P EST LOW 20 MIN: CPT | Performed by: INTERNAL MEDICINE

## 2020-02-20 PROCEDURE — 93000 ELECTROCARDIOGRAM COMPLETE: CPT | Performed by: INTERNAL MEDICINE

## 2020-07-01 RX ORDER — NADOLOL 40 MG/1
40 TABLET ORAL DAILY
Qty: 30 TABLET | Refills: 11 | Status: SHIPPED | OUTPATIENT
Start: 2020-07-01 | End: 2021-03-18 | Stop reason: SDUPTHER

## 2021-03-18 ENCOUNTER — OFFICE VISIT (OUTPATIENT)
Dept: CARDIOLOGY | Facility: CLINIC | Age: 56
End: 2021-03-18

## 2021-03-18 VITALS
DIASTOLIC BLOOD PRESSURE: 64 MMHG | HEIGHT: 69 IN | OXYGEN SATURATION: 96 % | SYSTOLIC BLOOD PRESSURE: 110 MMHG | WEIGHT: 180 LBS | HEART RATE: 54 BPM | BODY MASS INDEX: 26.66 KG/M2

## 2021-03-18 DIAGNOSIS — Z82.49 FAMILY HISTORY OF LONG QT SYNDROME: ICD-10-CM

## 2021-03-18 DIAGNOSIS — I45.81 LONG Q-T SYNDROME: Primary | ICD-10-CM

## 2021-03-18 PROCEDURE — 93000 ELECTROCARDIOGRAM COMPLETE: CPT | Performed by: INTERNAL MEDICINE

## 2021-03-18 PROCEDURE — 99213 OFFICE O/P EST LOW 20 MIN: CPT | Performed by: INTERNAL MEDICINE

## 2021-03-18 RX ORDER — NADOLOL 40 MG/1
40 TABLET ORAL DAILY
Qty: 90 TABLET | Refills: 3 | Status: SHIPPED | OUTPATIENT
Start: 2021-03-18 | End: 2022-04-08

## 2021-03-18 NOTE — PROGRESS NOTES
"Rachid KEARNS Vannesakevin  1965  [phone]    03/18/2021    [facility] CARDIOLOGY         Merle Segura MD  [address] [facility] [address]    Chief Complaint   Patient presents with   • Long QT Syndrome       Problem List:     1. Long QT Syndrome  a. 48 Hour Holter 6/25/18: 47 127 bpm, Average 77 bpm. Sinus Tachycarida 127 bpm. Rare PVCs. No symtpoms during monitor.   b. Echocardiogram 1/28/19: EF 60-65%, no valvular abnormalities  c. Positive Genetic Testing for LQTS Type 1  d. MCOT 10/2019: no atrial fibrillation or VT/VF  2. Family History of LQTS (sister)   3. Surgical History  a. inguinal hernia left repair    Allergies  No Known Allergies    Current Medications    Current Outpatient Medications:   •  nadolol (Corgard) 40 MG tablet, Take 1 tablet by mouth Daily. AT BEDTIME, Disp: 90 tablet, Rfl: 3    History of Present Illness:     Pt presents for follow up of. Since we last saw the pt, he has been doing well.  He denies SOB, CP, syncope. No palpitations. He has occasional presyncopal spells that are mild.  Denies any hospitalizations, ER visits, bleeding issues, or TIA/CVA symptoms. Overall feels well.     ROS:  General:  Denies fatigue, weight gain or loss  Cardiovascular:  Denies CP, PND, syncope, near syncope, edema or palpitations. + dizziness.   Pulmonary:  Denies MONTES, cough, or wheezing      Vitals:    03/18/21 1042   BP: 110/64   BP Location: Right arm   Patient Position: Sitting   Pulse: 54   SpO2: 96%   Weight: 81.6 kg (180 lb)   Height: 175.3 cm (69\")     Body mass index is 26.58 kg/m².  PE:  General: NAD  Neck: no JVD, no carotid bruits, no TM  Heart RRR, NL S1, S2, S4 present, no rubs, murmurs  Lungs: CTA, no wheezes, rhonchi, or rales  Abd: soft, non-tender, NL BS  Ext: No musculoskeletal deformities, no edema, cyanosis, or clubbing  Psych: normal mood and affect    Diagnostic Data:        ECG 12 Lead    Date/Time: 3/18/2021 10:56 AM  Performed by: Anand Welsh" MD  Authorized by: Anand Welsh MD   Comparison: compared with previous ECG from 2/20/2020  Similar to previous ECG  Rhythm: sinus rhythm  BPM: 58              1. Long Q-T syndrome    2. Family history of long QT syndrome          Plan:  1.  Long QT syndrome: QTC today measured at 440 ms.  Asymptomatic and  doing well on Nadolol     F/up in 12 months    Scribed for Anand Welsh MD by Anne Marie Kiran PA-C. 3/18/2021  11:08 EDT     I, Anand Welsh MD, personally performed the services described in this documentation as scribed by the above named individual in my presence, and it is both accurate and complete.  3/18/2021  11:08 EDT

## 2021-09-27 ENCOUNTER — OFFICE VISIT (OUTPATIENT)
Dept: FAMILY MEDICINE CLINIC | Age: 56
End: 2021-09-27
Payer: COMMERCIAL

## 2021-09-27 VITALS
DIASTOLIC BLOOD PRESSURE: 68 MMHG | WEIGHT: 189 LBS | OXYGEN SATURATION: 95 % | SYSTOLIC BLOOD PRESSURE: 100 MMHG | HEIGHT: 67 IN | HEART RATE: 67 BPM | BODY MASS INDEX: 29.66 KG/M2

## 2021-09-27 DIAGNOSIS — I45.81 PROLONGED QT INTERVAL SYNDROME: ICD-10-CM

## 2021-09-27 DIAGNOSIS — Z76.89 ENCOUNTER TO ESTABLISH CARE: Primary | ICD-10-CM

## 2021-09-27 DIAGNOSIS — N48.30 PAINFUL PENILE ERECTION: ICD-10-CM

## 2021-09-27 PROCEDURE — 36415 COLL VENOUS BLD VENIPUNCTURE: CPT

## 2021-09-27 PROCEDURE — 99203 OFFICE O/P NEW LOW 30 MIN: CPT

## 2021-09-27 RX ORDER — NADOLOL 40 MG/1
40 TABLET ORAL DAILY
COMMUNITY
Start: 2021-03-18

## 2021-09-27 ASSESSMENT — PATIENT HEALTH QUESTIONNAIRE - PHQ9
SUM OF ALL RESPONSES TO PHQ QUESTIONS 1-9: 0
SUM OF ALL RESPONSES TO PHQ9 QUESTIONS 1 & 2: 0
2. FEELING DOWN, DEPRESSED OR HOPELESS: 0
SUM OF ALL RESPONSES TO PHQ QUESTIONS 1-9: 0
1. LITTLE INTEREST OR PLEASURE IN DOING THINGS: 0
SUM OF ALL RESPONSES TO PHQ QUESTIONS 1-9: 0

## 2021-09-27 ASSESSMENT — ENCOUNTER SYMPTOMS
WHEEZING: 0
TROUBLE SWALLOWING: 0
SORE THROAT: 0
CHEST TIGHTNESS: 0
CONSTIPATION: 0
COLOR CHANGE: 0
RHINORRHEA: 0
EYE DISCHARGE: 0
CHOKING: 0
COUGH: 0
ABDOMINAL DISTENTION: 0
SHORTNESS OF BREATH: 0
DIARRHEA: 0
EYE PAIN: 0
ABDOMINAL PAIN: 0

## 2021-09-27 NOTE — PROGRESS NOTES
New Patient      Katelyn Watts  YOB: 1965    Date of Service:  9/27/2021    Chief Complaint:   Katelyn Watts is a 61 y.o. female who presents for   Chief Complaint   Patient presents with   [facility] Established New Doctor        HPI: Here to establish care with this provider. Previous PCP was Gonzalo Mane at [facility]. Sees Mary Ann Roach (cardiologist in [place]). States his next jasmyne is March of 2022. Pt also here today because he is concerned about his Penis. He states in the last month his penis has had a significant change in curvature while erect. Pt states he has always has some mild curvature. Now states he has a 45 degree or greater curvature while erect. States that having sex has become uncomfortable for himself as well as his wife. Denies having any trouble with urination. Denies any pain with orgasm. Advance Directive: N, <no information>      There is no immunization history on file for this patient.     No Known Allergies    Outpatient Medications Marked as Taking for the 9/27/21 encounter (Office Visit) with ТАТЬЯНА Prater CNP   Medication Sig Dispense Refill    nadolol (CORGARD) 40 MG tablet Take 40 mg by mouth daily      Multiple Vitamins-Minerals (THERAPEUTIC MULTIVITAMIN-MINERALS) tablet Take 1 tablet by mouth daily      NONFORMULARY daily Herbal for Prostate Health         Past Medical History:   Diagnosis Date    Heart palpitations     Long Q-T syndrome     Measles     Mumps        Past Surgical History:   Procedure Laterality Date    HERNIA REPAIR Left 11/1/2017    HERNIA INGUINAL REPAIR with mesh performed by Kiana Hoffman MD at [address] [place] [address] Left 11/01/2017       Family History   Problem Relation Age of Onset    No Known Problems Mother     Heart Attack Father     Depression Sister     No Known Problems Brother     Cancer Maternal Grandmother     Cancer Maternal Grandfather     Cancer Paternal Grandmother     Diabetes Paternal Grandfather     Other Sister     Depression Sister     Other Brother     No Known Problems Brother     No Known Problems Brother     No Known Problems Brother     No Known Problems Brother     Depression Brother     No Known Problems Brother        Social History     Socioeconomic History    Marital status:      Spouse name: Not on file    Number of children: Not on file    Years of education: Not on file    Highest education level: Not on file   Occupational History    Not on file   Tobacco Use    Smoking status: Never Smoker    Smokeless tobacco: Never Used   Substance and Sexual Activity    Alcohol use: No    Drug use: Yes    Sexual activity: Not on file   Other Topics Concern    Not on file   Social History Narrative    Not on file     Social Determinants of Health     Financial Resource Strain:     Difficulty of Paying Living Expenses:    Food Insecurity:     Worried About Running Out of Food in the Last Year:     [address] in the Last Year:    Transportation Needs:     Lack of Transportation (Medical):  Lack of Transportation (Non-Medical):    Physical Activity:     Days of Exercise per Week:     Minutes of Exercise per Session:    Stress:     Feeling of Stress :    Social Connections:     Frequency of Communication with Friends and Family:     Frequency of Social Gatherings with Friends and Family:     Attends [employer] Services:     Active Member of Clubs or Organizations:     Attends Club or Organization Meetings:     Marital Status:    Intimate Partner Violence:     Fear of Current or Ex-Partner:     Emotionally Abused:     Physically Abused:     Sexually Abused:        Review ofSystems:  Review of Systems   Constitutional: Negative for activity change, appetite change, chills, fatigue, fever and unexpected weight change. HENT: Negative for rhinorrhea, sore throat and trouble swallowing.     Eyes: Negative for pain, discharge and visual disturbance. Respiratory: Negative for cough, choking, chest tightness, shortness of breath and wheezing. Cardiovascular: Negative for chest pain, palpitations and leg swelling. Gastrointestinal: Negative for abdominal distention, abdominal pain, constipation and diarrhea. Endocrine: Negative for cold intolerance and heat intolerance. Genitourinary: Positive for penile pain. Negative for difficulty urinating, discharge, dysuria, frequency, penile swelling, scrotal swelling and testicular pain. Penile discomfort while erect having sex   Musculoskeletal: Negative for gait problem and neck stiffness. Skin: Negative for color change and rash. Neurological: Negative for dizziness, weakness and headaches. Psychiatric/Behavioral: Negative for dysphoric mood and sleep disturbance. Physical Exam:   /68   Pulse 67   Ht 5' 7\" (1.702 m)   Wt 189 lb (85.7 kg)   SpO2 95%   BMI 29.60 kg/m²     Physical Exam  Constitutional:       Appearance: Normal appearance. HENT:      Head: Normocephalic and atraumatic. Right Ear: External ear normal.      Left Ear: External ear normal.      Nose: Nose normal. No congestion. Mouth/Throat:      Mouth: Mucous membranes are moist.      Pharynx: Oropharynx is clear. Eyes:      Extraocular Movements: Extraocular movements intact. Pupils: Pupils are equal, round, and reactive to light. Cardiovascular:      Rate and Rhythm: Normal rate and regular rhythm. Pulses: Normal pulses. Heart sounds: Normal heart sounds. No murmur heard. Pulmonary:      Effort: Pulmonary effort is normal. No respiratory distress. Breath sounds: Normal breath sounds. No wheezing. Abdominal:      General: Bowel sounds are normal.      Palpations: Abdomen is soft. Tenderness: There is no abdominal tenderness. Genitourinary:     Penis: Normal.       Testes: Normal.   Musculoskeletal:         General: Normal range of motion. Cervical back: Normal range of motion and neck supple. Right lower leg: No edema. Left lower leg: No edema. Skin:     General: Skin is warm and dry. Capillary Refill: Capillary refill takes less than 2 seconds. Findings: No rash. Neurological:      General: No focal deficit present. Mental Status: He is alert and oriented to person, place, and time. Motor: No weakness. Psychiatric:         Mood and Affect: Mood normal.         Behavior: Behavior normal.         Assessment/Plan:  1. Encounter to establish care  To establish care with this provider. Wanted to see a different PCP.  - Lipid Panel  - CBC Auto Differential    2. Painful penile erection  Having pain while erect having sex  - Cherylene Lips, MD, Urology, [facility]    3.  Prolonged QT interval syndrome    - COMPREHENSIVE METABOLIC PANEL

## 2021-09-28 LAB
A/G RATIO: 1.7 (ref 1.1–2.2)
ALBUMIN SERPL-MCNC: 4.3 G/DL (ref 3.4–5)
ALP BLD-CCNC: 77 U/L (ref 40–129)
ALT SERPL-CCNC: 33 U/L (ref 10–40)
ANION GAP SERPL CALCULATED.3IONS-SCNC: 13 MMOL/L (ref 3–16)
AST SERPL-CCNC: 20 U/L (ref 15–37)
BASOPHILS ABSOLUTE: 0.1 K/UL (ref 0–0.2)
BASOPHILS RELATIVE PERCENT: 0.9 %
BILIRUB SERPL-MCNC: 0.9 MG/DL (ref 0–1)
BUN BLDV-MCNC: 15 MG/DL (ref 7–20)
CALCIUM SERPL-MCNC: 9.5 MG/DL (ref 8.3–10.6)
CHLORIDE BLD-SCNC: 104 MMOL/L (ref 99–110)
CHOLESTEROL, TOTAL: 214 MG/DL (ref 0–199)
CO2: 21 MMOL/L (ref 21–32)
CREAT SERPL-MCNC: 0.8 MG/DL (ref 0.9–1.3)
EOSINOPHILS ABSOLUTE: 0.3 K/UL (ref 0–0.6)
EOSINOPHILS RELATIVE PERCENT: 5.1 %
GFR AFRICAN AMERICAN: >60
GFR NON-AFRICAN AMERICAN: >60
GLOBULIN: 2.6 G/DL
GLUCOSE BLD-MCNC: 105 MG/DL (ref 70–99)
HCT VFR BLD CALC: 45.6 % (ref 40.5–52.5)
HDLC SERPL-MCNC: 34 MG/DL (ref 40–60)
HEMOGLOBIN: 15.4 G/DL (ref 13.5–17.5)
LDL CHOLESTEROL CALCULATED: 135 MG/DL
LYMPHOCYTES ABSOLUTE: 1.7 K/UL (ref 1–5.1)
LYMPHOCYTES RELATIVE PERCENT: 30.4 %
MCH RBC QN AUTO: 28.1 PG (ref 26–34)
MCHC RBC AUTO-ENTMCNC: 33.8 G/DL (ref 31–36)
MCV RBC AUTO: 83.3 FL (ref 80–100)
MONOCYTES ABSOLUTE: 0.7 K/UL (ref 0–1.3)
MONOCYTES RELATIVE PERCENT: 12.8 %
NEUTROPHILS ABSOLUTE: 2.8 K/UL (ref 1.7–7.7)
NEUTROPHILS RELATIVE PERCENT: 50.8 %
PDW BLD-RTO: 13.9 % (ref 12.4–15.4)
PLATELET # BLD: 217 K/UL (ref 135–450)
PMV BLD AUTO: 8.8 FL (ref 5–10.5)
POTASSIUM SERPL-SCNC: 4.2 MMOL/L (ref 3.5–5.1)
RBC # BLD: 5.47 M/UL (ref 4.2–5.9)
SODIUM BLD-SCNC: 138 MMOL/L (ref 136–145)
TOTAL PROTEIN: 6.9 G/DL (ref 6.4–8.2)
TRIGL SERPL-MCNC: 223 MG/DL (ref 0–150)
VLDLC SERPL CALC-MCNC: 45 MG/DL
WBC # BLD: 5.6 K/UL (ref 4–11)

## 2022-01-03 ENCOUNTER — OFFICE VISIT (OUTPATIENT)
Dept: FAMILY MEDICINE CLINIC | Age: 57
End: 2022-01-03
Payer: COMMERCIAL

## 2022-01-03 ENCOUNTER — TELEPHONE (OUTPATIENT)
Dept: FAMILY MEDICINE CLINIC | Age: 57
End: 2022-01-03

## 2022-01-03 VITALS
WEIGHT: 190.4 LBS | BODY MASS INDEX: 29.88 KG/M2 | OXYGEN SATURATION: 97 % | HEART RATE: 68 BPM | DIASTOLIC BLOOD PRESSURE: 72 MMHG | HEIGHT: 67 IN | SYSTOLIC BLOOD PRESSURE: 106 MMHG

## 2022-01-03 DIAGNOSIS — J06.9 UPPER RESPIRATORY TRACT INFECTION, UNSPECIFIED TYPE: ICD-10-CM

## 2022-01-03 DIAGNOSIS — R05.9 COUGH: Primary | ICD-10-CM

## 2022-01-03 DIAGNOSIS — R51.9 ACUTE NONINTRACTABLE HEADACHE, UNSPECIFIED HEADACHE TYPE: Primary | ICD-10-CM

## 2022-01-03 PROCEDURE — 99213 OFFICE O/P EST LOW 20 MIN: CPT | Performed by: FAMILY MEDICINE

## 2022-01-03 RX ORDER — CEFDINIR 300 MG/1
300 CAPSULE ORAL 2 TIMES DAILY
Qty: 20 CAPSULE | Refills: 0 | Status: SHIPPED | OUTPATIENT
Start: 2022-01-03 | End: 2022-01-13

## 2022-01-03 RX ORDER — ALBUTEROL SULFATE 2.5 MG/3ML
2.5 SOLUTION RESPIRATORY (INHALATION) EVERY 6 HOURS PRN
Qty: 120 EACH | Refills: 3 | Status: SHIPPED | OUTPATIENT
Start: 2022-01-03

## 2022-01-03 ASSESSMENT — ENCOUNTER SYMPTOMS
SINUS PRESSURE: 1
SHORTNESS OF BREATH: 0
SORE THROAT: 1
COUGH: 1

## 2022-01-03 NOTE — TELEPHONE ENCOUNTER
Pt informed. He is self pay and would like to go to [facility] for the test. Put pt in for 240 and if he is pos we will do a vv.  If neg pt will come in for appt

## 2022-01-03 NOTE — PROGRESS NOTES
Chief Complaint   Patient presents with    Sinusitis    Cough       HPI:  Matt Link is a 61 y.o. (: 1965) here today   for sinus congestion, cough and sneezing. Sinusitis  This is a new problem. The current episode started in the past 7 days (since ). The problem has been gradually improving since onset. Maximum temperature: initially, up to 101.8 last week. Associated symptoms include congestion, coughing, headaches, sinus pressure and a sore throat. Pertinent negatives include no shortness of breath. Past treatments include acetaminophen (essential oils, diffusers). The treatment provided mild relief. Has nebulizer at home. Has used off and on. Wonders if would benefit from albuterol. Patient's medications, allergies, past medical, surgical, social and family histories were reviewed and updated as appropriate. ROS:  Review of Systems   HENT: Positive for congestion, sinus pressure and sore throat. Respiratory: Positive for cough. Negative for shortness of breath. Neurological: Positive for headaches. Prior to Visit Medications    Medication Sig Taking?  Authorizing Provider   albuterol (PROVENTIL) (2.5 MG/3ML) 0.083% nebulizer solution Take 3 mLs by nebulization every 6 hours as needed for Wheezing Yes Nate Kennedy MD   cefdinir (OMNICEF) 300 MG capsule Take 1 capsule by mouth 2 times daily for 10 days Yes Nate Kennedy MD   nadolol (CORGARD) 40 MG tablet Take 40 mg by mouth daily Yes Historical Provider, MD   Multiple Vitamins-Minerals (THERAPEUTIC MULTIVITAMIN-MINERALS) tablet Take 1 tablet by mouth daily Yes Historical Provider, MD   NONFORMULARY daily Herbal for Prostate Health Yes Historical Provider, MD       No Known Allergies    OBJECTIVE:    /72   Pulse 68   Ht 5' 7\" (1.702 m)   Wt 190 lb 6.4 oz (86.4 kg)   SpO2 97%   BMI 29.82 kg/m²     BP Readings from Last 2 Encounters:   22 106/72   21 100/68       Wt Readings from Last 3 Encounters:   01/03/22 190 lb 6.4 oz (86.4 kg)   09/27/21 189 lb (85.7 kg)   11/01/17 186 lb 1.1 oz (84.4 kg)       Physical Exam  Constitutional:       Appearance: He is well-developed. HENT:      Head: Normocephalic and atraumatic. Right Ear: Hearing and ear canal normal. A middle ear effusion is present. Left Ear: Hearing and ear canal normal. A middle ear effusion is present. Nose:      Right Sinus: No maxillary sinus tenderness or frontal sinus tenderness. Left Sinus: No maxillary sinus tenderness or frontal sinus tenderness. Eyes:      Conjunctiva/sclera: Conjunctivae normal.   Neck:      Trachea: No tracheal deviation. Cardiovascular:      Rate and Rhythm: Normal rate and regular rhythm. Heart sounds: No murmur heard. No friction rub. No gallop. Pulmonary:      Effort: Pulmonary effort is normal. No respiratory distress. Breath sounds: Normal breath sounds. Abdominal:      Palpations: Abdomen is soft. Tenderness: There is no abdominal tenderness. Lymphadenopathy:      Cervical: No cervical adenopathy. Skin:     General: Skin is warm and dry. Findings: No rash. Neurological:      Mental Status: He is alert and oriented to person, place, and time. Psychiatric:         Mood and Affect: Mood normal.         Behavior: Behavior normal.           ASSESSMENT/PLAN:     1. Cough  Has nebulizer. Provided medication. - albuterol (PROVENTIL) (2.5 MG/3ML) 0.083% nebulizer solution; Take 3 mLs by nebulization every 6 hours as needed for Wheezing  Dispense: 120 each; Refill: 3    2. Upper respiratory tract infection, unspecified type  Improving. Discussed symptomatic tx. Fill abx if sxs fail to improve. - cefdinir (OMNICEF) 300 MG capsule; Take 1 capsule by mouth 2 times daily for 10 days  Dispense: 20 capsule;  Refill: 0

## 2022-01-03 NOTE — TELEPHONE ENCOUNTER
Please place dry swab Covid test order. Patient can have done at [facility].  I did see the patient on a video visit if they would like to do that. If they cannot do a video visit Covid test is negative we can do an in office visit.   Thank you

## 2022-01-03 NOTE — TELEPHONE ENCOUNTER
----- Message from Andrea Topete sent at 1/3/2022 12:53 PM EST -----  Subject: Message to Provider    QUESTIONS  Information for Provider? Pt of Dr. Delrae Simmonds? Pt had a referral for a   Urologist pt doesnt remember name of DR. Please call Pt @ [phone]  ---------------------------------------------------------------------------  --------------  Silvio FRAZIER  What is the best way for the office to contact you? OK to leave message on   voicemail  Preferred Call Back Phone Number? [phone]  ---------------------------------------------------------------------------  --------------  SCRIPT ANSWERS  Relationship to Patient?  Self

## 2022-01-03 NOTE — TELEPHONE ENCOUNTER
Pt called in and had cough congestion and headache wanted something for a nebulizer I told him he would need to be seen. Does he need a COVID TEST Before we see him?

## 2022-02-02 ENCOUNTER — TELEPHONE (OUTPATIENT)
Dept: FAMILY MEDICINE CLINIC | Age: 57
End: 2022-02-02

## 2022-02-02 DIAGNOSIS — Z12.11 SCREENING FOR COLON CANCER: Primary | ICD-10-CM

## 2022-02-02 DIAGNOSIS — Z12.11 ENCOUNTER FOR SCREENING COLONOSCOPY: Primary | ICD-10-CM

## 2022-02-02 NOTE — TELEPHONE ENCOUNTER
Okay to place order for colonoscopy. Patient will probably have to go to [facility] or [facility] for the colonoscopy. If going to [facility] or [facility] please place order for Shelley Kauffman.

## 2022-02-02 NOTE — TELEPHONE ENCOUNTER
----- Message from Kathya Salvador sent at 2/2/2022 10:09 AM EST -----  Subject: Referral Request    QUESTIONS   Reason for referral request? Colonoscopy   Has the physician seen you for this condition before? No   Preferred Specialist (if applicable)? Austin Boyce you already have an appointment scheduled? No  Additional Information for Provider? Patient would like to know if he can   get a Colonoscopy. States he does not have any current concerns would like   to go to [facility] if possible.   ---------------------------------------------------------------------------  --------------  [address]  What is the best way for the office to contact you? OK to leave message on   voicemail  Preferred Call Back Phone Number?  [phone]

## 2022-02-07 ENCOUNTER — NURSE ONLY (OUTPATIENT)
Dept: FAMILY MEDICINE CLINIC | Age: 57
End: 2022-02-07

## 2022-02-07 ENCOUNTER — TELEPHONE (OUTPATIENT)
Dept: FAMILY MEDICINE CLINIC | Age: 57
End: 2022-02-07

## 2022-02-07 VITALS — DIASTOLIC BLOOD PRESSURE: 42 MMHG | SYSTOLIC BLOOD PRESSURE: 80 MMHG | HEART RATE: 173 BPM

## 2022-02-07 DIAGNOSIS — R00.0 TACHYCARDIA: Primary | ICD-10-CM

## 2022-02-07 DIAGNOSIS — R00.0 TACHYCARDIA, UNSPECIFIED: Primary | ICD-10-CM

## 2022-02-07 DIAGNOSIS — I45.81 LONG Q-T SYNDROME: ICD-10-CM

## 2022-02-07 NOTE — PROGRESS NOTES
ER that patient was in called Dr. Welsh to report rapid HRs in 160s however no one obtained any rhythm strips.  Per Dr. Welsh will order 30day MCOT and see in office in about 2 weeks.

## 2022-02-23 ENCOUNTER — OFFICE VISIT (OUTPATIENT)
Dept: CARDIOLOGY | Facility: CLINIC | Age: 57
End: 2022-02-23

## 2022-02-23 VITALS
BODY MASS INDEX: 27.55 KG/M2 | OXYGEN SATURATION: 99 % | HEART RATE: 67 BPM | SYSTOLIC BLOOD PRESSURE: 98 MMHG | HEIGHT: 69 IN | DIASTOLIC BLOOD PRESSURE: 72 MMHG | WEIGHT: 186 LBS

## 2022-02-23 DIAGNOSIS — I47.1 SUSTAINED SVT: ICD-10-CM

## 2022-02-23 DIAGNOSIS — I47.1 AVNRT (AV NODAL RE-ENTRY TACHYCARDIA): ICD-10-CM

## 2022-02-23 DIAGNOSIS — I25.10 CORONARY ARTERY DISEASE INVOLVING NATIVE CORONARY ARTERY OF NATIVE HEART WITHOUT ANGINA PECTORIS: ICD-10-CM

## 2022-02-23 DIAGNOSIS — I45.81 LONG Q-T SYNDROME: Primary | ICD-10-CM

## 2022-02-23 PROBLEM — I47.10 SUSTAINED SVT: Status: ACTIVE | Noted: 2022-02-23

## 2022-02-23 PROCEDURE — 99213 OFFICE O/P EST LOW 20 MIN: CPT | Performed by: INTERNAL MEDICINE

## 2022-02-23 PROCEDURE — 93283 PRGRMG EVAL IMPLANTABLE DFB: CPT | Performed by: INTERNAL MEDICINE

## 2022-02-23 PROCEDURE — 93000 ELECTROCARDIOGRAM COMPLETE: CPT | Performed by: INTERNAL MEDICINE

## 2022-02-23 RX ORDER — ATORVASTATIN CALCIUM 80 MG/1
80 TABLET, FILM COATED ORAL DAILY
COMMUNITY
Start: 2022-02-10

## 2022-02-23 RX ORDER — ALBUTEROL SULFATE 2.5 MG/3ML
2.5 SOLUTION RESPIRATORY (INHALATION) AS NEEDED
COMMUNITY
Start: 2022-01-03 | End: 2022-10-20

## 2022-02-23 RX ORDER — ASPIRIN 81 MG/1
81 TABLET, CHEWABLE ORAL DAILY
COMMUNITY
Start: 2022-02-11

## 2022-02-23 RX ORDER — MULTIPLE VITAMINS W/ MINERALS TAB 9MG-400MCG
1 TAB ORAL DAILY
COMMUNITY

## 2022-02-23 NOTE — PROGRESS NOTES
Rachid Jaramillo  1965  [phone]    02/23/2022    [facility] CARDIOLOGY     Referring Provider: No ref. provider found     Provider, No Known  [place] [id number]    Chief Complaint   Patient presents with   • Long QT Syndrome     Problem List:      1. Long QT Syndrome  a. 48 Hour Holter 6/25/18: 47 127 bpm, Average 77 bpm. Sinus Tachycarida 127 bpm. Rare PVCs. No symtpoms during monitor.   b. Echocardiogram 1/28/19: EF 60-65%, no valvular abnormalities  c. 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 MCOT 10/2019: no atrial fibrillation or VT/VF  e. Admission at  2/2022 for SVT:  1. LHC 2/8/2022: LAD with proximal vessel lesion 60% stenosis, treated medically  2. Echocardiogram 2/10/2022 EF 50 to 55%, trivial pericardial effusion  3. Medtronic DDD ICD implant 2/9/2022 due to episode of near syncope likely cardiogenic in origin, family history Long QT, and sinus bradycardia, complicated by postoperative pneumothorax requiring chest tube  2. AVNRT  1. EP study with RFA of slow pathway modification for typical AVNRT Song Rogers MD at  2/16/2022  3. Family History of LQTS Type 1 (sister)   4. Surgical History  a. inguinal hernia left repair      Allergies  No Known Allergies    Current Medications:     Current Outpatient Medications:   •  albuterol (PROVENTIL) (2.5 MG/3ML) 0.083% nebulizer solution, 2.5 mg As Needed., Disp: , Rfl:   •  aspirin 81 MG chewable tablet, Chew 81 mg Daily., Disp: , Rfl:   •  atorvastatin (LIPITOR) 80 MG tablet, Take 80 mg by mouth Daily., Disp: , Rfl:   •  multivitamin with minerals (therapeutic multivitamin-minerals) tablet tablet, Take 1 tablet by mouth Daily., Disp: , Rfl:   •  nadolol (Corgard) 40 MG tablet, Take 1 tablet by mouth Daily. AT BEDTIME, Disp: 90 tablet, Rfl: 3    History of Present Illness:      Pt presents for follow up of LQTS on Nadolol. Since we last saw the pt, he states that on 2/7/2022, he felt a fast HR  "for 5 hours that made him feel weak. He went to his doctor, and was instructed to go to the ED at [place]. He was transferred to  and admitted from 2/8-2/10/22 and had a MDT defibrillator placed on 2/8/2022. He also had a LHC and was told he a 50-60% blockage. Echocardiogram was done as well but he does not know the results. He was discharged on 2/10/2021. On 2/14/2022, he had a fast HR again, around 160 bpm. He went back to , and found to have a PTX, requiring a chest tube for 2-3 days. His device interrogation showed SVT, and he underwent ablation apparently. He was discharged on 2/18/2022. Since then, he has been feeling well. Dr. Welsh had ordered an event monitor, but due to all events as described above, he did not end up wearing it. Of note, he had COVID 3 weeks prior to this. He was not vaccinated. He had flu like symptoms and lost his taste and smell. BP has been checked at home, apparently his BP was in the 80s while he was admitted at . No dizziness.     ROS:  General:  +  fatigue, - weight gain or loss  Cardiovascular:  Denies CP, PND, syncope, near syncope, edema + palpitations.  Pulmonary:  Denies MONTES, cough, or wheezing      Vitals:    02/23/22 1026   BP: 98/72   BP Location: Left arm   Patient Position: Sitting   Pulse: 67   SpO2: 99%   Weight: 84.4 kg (186 lb)   Height: 175.3 cm (69\")     Body mass index is 27.47 kg/m².  PE:  General: NAD  Neck: no JVD, no carotid bruits, no TM  Heart RRR, NL S1, S2, S4 present, no rubs, murmurs  Lungs: CTA, no wheezes, rhonchi, or rales  Abd: soft, non-tender, NL BS  Ext: No musculoskeletal deformities, no edema, cyanosis, or clubbing  Psych: normal mood and affect    Diagnostic Data:    MDT ICD Interrogation: DDD ICD with normal function 11 years on battery no events except on 2/16/2022 during the time of his SVT ablation. 6% RA paced, 0% RV paced        ECG 12 Lead    Date/Time: 2/23/2022 10:48 AM  Performed by: Anand Welsh MD  Authorized by: " Anand Welsh MD   Comparison: compared with previous ECG from 3/18/2021  Similar to previous ECG  Comparison to previous ECG: QTc 460 ms  BPM: 66              1. Long Q-T syndrome    2. Sustained SVT (HCC)    3. AVNRT (AV kristopher re-entry tachycardia) (HCC)    4. Coronary artery disease involving native coronary artery of native heart without angina pectoris          Plan:  1.  Long QT syndrome: QTc stable today  - on Nadolol 40 mg daily  - now with ICD.  Normal interrogation today    2.  AVNRT:  -S/p AVNRT ablation at  2/16/2022    3. CAD: on lipitor, ASA, BBL    F/up in 6 months in [facility]    Scribed for Anand Welsh MD by Anne Marie Kiran PA-C. 2/23/2022  11:07 Anand CRENSHAW MD, personally performed the services face to face as described and documented by the above named individual. I have made any necessary edits and it is both accurate and complete 2/23/2022  11:07 EST

## 2022-02-25 ENCOUNTER — TELEPHONE (OUTPATIENT)
Dept: CARDIOLOGY | Facility: CLINIC | Age: 57
End: 2022-02-25

## 2022-02-25 NOTE — TELEPHONE ENCOUNTER
Enrolled Mr Jaramillo into [employer] home monitoring and model and SN # of home monitor is entered.  First reading needs to be manually sent.  Called and left a message asking him to do so and if he needs assistance I left my name and number.

## 2022-04-08 RX ORDER — NADOLOL 40 MG/1
40 TABLET ORAL DAILY
Qty: 90 TABLET | Refills: 2 | Status: SHIPPED | OUTPATIENT
Start: 2022-04-08 | End: 2022-12-29

## 2022-04-10 PROCEDURE — 93295 DEV INTERROG REMOTE 1/2/MLT: CPT | Performed by: INTERNAL MEDICINE

## 2022-04-10 PROCEDURE — 93296 REM INTERROG EVL PM/IDS: CPT | Performed by: INTERNAL MEDICINE

## 2022-05-05 ENCOUNTER — TELEPHONE (OUTPATIENT)
Dept: CARDIOLOGY | Facility: CLINIC | Age: 57
End: 2022-05-05

## 2022-05-05 NOTE — TELEPHONE ENCOUNTER
Anabella from [facility] called requesting clearance for patient who is schedule for a colonoscopy on 6/27/22 with Dr. Gupta.     (f) [phone]  (p)[phone]

## 2022-05-09 RX ORDER — ATORVASTATIN CALCIUM 80 MG/1
TABLET, FILM COATED ORAL
Qty: 30 TABLET | Refills: 1 | Status: SHIPPED | OUTPATIENT
Start: 2022-05-09 | End: 2022-07-07

## 2022-05-09 RX ORDER — ASPIRIN 81 MG
TABLET,CHEWABLE ORAL
Qty: 30 TABLET | Refills: 1 | Status: SHIPPED | OUTPATIENT
Start: 2022-05-09 | End: 2022-07-07

## 2022-07-07 RX ORDER — ATORVASTATIN CALCIUM 80 MG/1
TABLET, FILM COATED ORAL
Qty: 90 TABLET | Refills: 3 | Status: SHIPPED | OUTPATIENT
Start: 2022-07-07

## 2022-07-07 RX ORDER — ASPIRIN 81 MG
TABLET,CHEWABLE ORAL
Qty: 90 TABLET | Refills: 1 | Status: SHIPPED | OUTPATIENT
Start: 2022-07-07

## 2022-10-09 PROCEDURE — 93296 REM INTERROG EVL PM/IDS: CPT | Performed by: INTERNAL MEDICINE

## 2022-10-09 PROCEDURE — 93295 DEV INTERROG REMOTE 1/2/MLT: CPT | Performed by: INTERNAL MEDICINE

## 2022-10-20 ENCOUNTER — OFFICE VISIT (OUTPATIENT)
Dept: CARDIOLOGY | Facility: CLINIC | Age: 57
End: 2022-10-20

## 2022-10-20 VITALS
HEART RATE: 72 BPM | WEIGHT: 180 LBS | HEIGHT: 69 IN | DIASTOLIC BLOOD PRESSURE: 78 MMHG | BODY MASS INDEX: 26.66 KG/M2 | OXYGEN SATURATION: 97 % | SYSTOLIC BLOOD PRESSURE: 106 MMHG

## 2022-10-20 DIAGNOSIS — I25.10 CORONARY ARTERY DISEASE INVOLVING NATIVE CORONARY ARTERY OF NATIVE HEART WITHOUT ANGINA PECTORIS: ICD-10-CM

## 2022-10-20 DIAGNOSIS — I45.81 LONG Q-T SYNDROME: Primary | ICD-10-CM

## 2022-10-20 DIAGNOSIS — I47.1 SUSTAINED SVT: ICD-10-CM

## 2022-10-20 PROCEDURE — 99213 OFFICE O/P EST LOW 20 MIN: CPT | Performed by: INTERNAL MEDICINE

## 2022-10-20 PROCEDURE — 93283 PRGRMG EVAL IMPLANTABLE DFB: CPT | Performed by: INTERNAL MEDICINE

## 2022-10-20 PROCEDURE — 93000 ELECTROCARDIOGRAM COMPLETE: CPT | Performed by: INTERNAL MEDICINE

## 2022-10-20 NOTE — PROGRESS NOTES
Rachid Jaramillo  1965  [phone]    10/20/2022    [facility] CARDIOLOGY [place]     Referring Provider: No ref. provider found     Provider, No Known  [facility] [id number]    CC: Long QT syndrome     Problem List:      1. Long QT Syndrome  a. 48 Hour Holter 6/25/18: 47 127 bpm, Average 77 bpm. Sinus Tachycarida 127 bpm. Rare PVCs. No symtpoms during monitor.   b. Echocardiogram 1/28/19: EF 60-65%, no valvular abnormalities  c. 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 MCOT 10/2019: no atrial fibrillation or VT/VF  e. Admission at  2/2022 for SVT:  1. LHC 2/8/2022: LAD with proximal vessel lesion 60% stenosis, treated medically  2. Echocardiogram 2/10/2022 EF 50 to 55%, trivial pericardial effusion  3. Medtronic DDD ICD implant 2/9/2022 due to episode of near syncope likely cardiogenic in origin, family history Long QT, and sinus bradycardia, complicated by postoperative pneumothorax requiring chest tube  2. AVNRT  1. EP study with RFA of slow pathway modification for typical AVNRT Song Rogers MD at  2/16/2022  3. Family History of LQTS Type 1 (sister)   4. Surgical History  a. inguinal hernia left repair       Allergies  No Known Allergies    Current Medications    Current Outpatient Medications:   •  aspirin 81 MG chewable tablet, Chew 81 mg Daily., Disp: , Rfl:   •  atorvastatin (LIPITOR) 80 MG tablet, Take 80 mg by mouth Daily., Disp: , Rfl:   •  multivitamin with minerals tablet tablet, Take 1 tablet by mouth Daily., Disp: , Rfl:   •  nadolol (CORGARD) 40 MG tablet, TAKE 1 TABLET BY MOUTH DAILY AT BEDTIME, Disp: 90 tablet, Rfl: 2    History of Present Illness     Pt presents for follow up of LQTS. Since we last saw the pt, pt denies any episodes, SOB, CP, LH, and dizziness. Denies any hospitalizations, ER visits, or TIA/CVA symptoms. Overall feels well.    ROS:  General:  Denies fatigue, weight gain or loss  Cardiovascular:  Denies CP, PND,  "syncope, near syncope, edema or palpitations.  Pulmonary:  Denies MONTES, cough, or wheezing      Vitals:    10/20/22 1310   BP: 106/78   BP Location: Left arm   Patient Position: Sitting   Pulse: 72   SpO2: 97%   Weight: 81.6 kg (180 lb)   Height: 175.3 cm (69\")     Body mass index is 26.58 kg/m².  PE:  General: NAD  Neck: no JVD, no carotid bruits, no TM  Heart RRR, NL S1, S2, S4 present, no rubs, murmurs  Lungs: CTA, no wheezes, rhonchi, or rales  Abd: soft, non-tender, NL BS  Ext: No musculoskeletal deformities, no edema, cyanosis, or clubbing  Psych: normal mood and affect    Diagnostic Data:  Manual MDT ICD Interrogation: DDD ICD with normal function 10.4 years on battery, no events. 16% RA paced, 0% RV paced      ECG 12 Lead    Date/Time: 10/20/2022 1:26 PM  Performed by: Anand Welsh MD  Authorized by: Anand Welsh MD   Comparison: compared with previous ECG from 2/23/2022  Similar to previous ECG  Rhythm: sinus rhythm  Rate: normal  BPM: 69              1. Long Q-T syndrome    2. Sustained SVT (HCC)    3. Coronary artery disease involving native coronary artery of native heart without angina pectoris          Plan:  1.  Long QT syndrome: QTc stable today  - on Nadolol 40 mg daily  - 2/2022: ICD.  Normal interrogation today     2.  AVNRT:  -S/p AVNRT ablation at  2/16/2022     3. CAD: on lipitor, ASA, BBL  -PCP Dr. Mata will follow lipid panel and liver enzymes      F/up in 8 months in [facility]    Scribed for Anand Welsh MD by Shasta Reed, KYLIE. 10/20/2022  13:28 EDT      I, Anand Welsh MD, personally performed the services described in this documentation as scribed by the above named individual in my presence, and it is both accurate and complete.  10/20/2022  13:46 EDT      "

## 2022-10-24 ENCOUNTER — OFFICE VISIT (OUTPATIENT)
Dept: FAMILY MEDICINE CLINIC | Age: 57
End: 2022-10-24
Payer: COMMERCIAL

## 2022-10-24 VITALS
WEIGHT: 187 LBS | SYSTOLIC BLOOD PRESSURE: 108 MMHG | OXYGEN SATURATION: 96 % | BODY MASS INDEX: 29.29 KG/M2 | HEART RATE: 88 BPM | DIASTOLIC BLOOD PRESSURE: 76 MMHG

## 2022-10-24 DIAGNOSIS — I45.81 PROLONGED QT INTERVAL SYNDROME: Primary | ICD-10-CM

## 2022-10-24 DIAGNOSIS — E78.2 MIXED HYPERLIPIDEMIA: ICD-10-CM

## 2022-10-24 DIAGNOSIS — R73.01 IFG (IMPAIRED FASTING GLUCOSE): ICD-10-CM

## 2022-10-24 PROCEDURE — 36415 COLL VENOUS BLD VENIPUNCTURE: CPT

## 2022-10-24 PROCEDURE — 99213 OFFICE O/P EST LOW 20 MIN: CPT

## 2022-10-24 ASSESSMENT — PATIENT HEALTH QUESTIONNAIRE - PHQ9
SUM OF ALL RESPONSES TO PHQ QUESTIONS 1-9: 0
SUM OF ALL RESPONSES TO PHQ9 QUESTIONS 1 & 2: 0
SUM OF ALL RESPONSES TO PHQ QUESTIONS 1-9: 0
2. FEELING DOWN, DEPRESSED OR HOPELESS: 0
SUM OF ALL RESPONSES TO PHQ QUESTIONS 1-9: 0
SUM OF ALL RESPONSES TO PHQ QUESTIONS 1-9: 0
1. LITTLE INTEREST OR PLEASURE IN DOING THINGS: 0

## 2022-10-24 ASSESSMENT — ENCOUNTER SYMPTOMS
GASTROINTESTINAL NEGATIVE: 1
EYES NEGATIVE: 1
RESPIRATORY NEGATIVE: 1

## 2022-10-24 NOTE — PROGRESS NOTES
Chief Complaint   Patient presents with    Check-Up       HPI:  Lazarus Pole is a 61 y.o. (: 1965) here today   for routine checkup. Long QT syndrome: On nadolol 40 mg daily. BP and heart rate stable today. Following cardiology . Last saw on 10/20/2022. Advised to follow-up in 8 months at the [facility]. On high dose statin, and ASA 81mg daily. Hyperlipidemia: On Lipitor 80 mg daily    Patient's medications, allergies, past medical, surgical, social and family histories were reviewed and updated asappropriate. ROS:  Review of Systems   Constitutional: Negative. HENT: Negative. Eyes: Negative. Respiratory: Negative. Cardiovascular: Negative. Gastrointestinal: Negative. Musculoskeletal: Negative. Skin: Negative. Neurological: Negative. Psychiatric/Behavioral: Negative. Prior to Visit Medications    Medication Sig Taking?  Authorizing Provider   atorvastatin (LIPITOR) 80 MG tablet TAKE 1 TABLET BY MOUTH AT BEDTIME Yes Blease ТАТЬЯНА Lanza CNP   ASPIRIN LOW DOSE 81 MG chewable tablet TAKE 1 TABLET CHEW THEN SWALLOW IN THE MORNING Yes BleТАТЬЯНА Kamara CNP   albuterol (PROVENTIL) (2.5 MG/3ML) 0.083% nebulizer solution Take 3 mLs by nebulization every 6 hours as needed for Wheezing Yes Prosper Pa MD   nadolol (CORGARD) 40 MG tablet Take 40 mg by mouth daily Yes Historical Provider, MD   Multiple Vitamins-Minerals (THERAPEUTIC MULTIVITAMIN-MINERALS) tablet Take 1 tablet by mouth daily Yes Historical Provider, MD   NONFORMULARY daily Herbal for Prostate Health Yes Historical Provider, MD       No Known Allergies    OBJECTIVE:    /76   Pulse 88   Wt 187 lb (84.8 kg)   SpO2 96%   BMI 29.29 kg/m²     BP Readings from Last 2 Encounters:   10/24/22 108/76   22 (!) 80/42       Wt Readings from Last 3 Encounters:   10/24/22 187 lb (84.8 kg)   22 190 lb 6.4 oz (86.4 kg)   21 189 lb (85.7 kg)       Physical Exam  Constitutional:       Appearance: Normal appearance. Cardiovascular:      Rate and Rhythm: Normal rate and regular rhythm. Pulses: Normal pulses. Heart sounds: Normal heart sounds. No murmur heard. Pulmonary:      Breath sounds: No wheezing. Abdominal:      General: Bowel sounds are normal.   Musculoskeletal:         General: Normal range of motion. Skin:     General: Skin is warm. Capillary Refill: Capillary refill takes less than 2 seconds. Neurological:      General: No focal deficit present. Mental Status: He is alert and oriented to person, place, and time. Psychiatric:         Mood and Affect: Mood normal.         Behavior: Behavior normal.         ASSESSMENT/PLAN:    1. Prolonged QT interval syndrome  Following cardiology, see above HPI. Repeat labs ordered today. Continue on current medication regimen as ordered. - Comprehensive Metabolic Panel  - LIPID PANEL    2. Mixed hyperlipidemia  Repeat labs ordered today. Patient not fasting but want to proceed forward with lab drawl today. Patient is [place] in past to rely on transportation.  - Comprehensive Metabolic Panel  - LIPID PANEL    3. IFG (impaired fasting glucose)  Drawn in office today  - Hemoglobin A1C    25 min spent on pt care. This document was prepared by a combination of typing and transcription through a voice recognition software.     Migel Felton, ТАТЬЯНА - CNP

## 2022-10-25 LAB
A/G RATIO: 2 (ref 1.1–2.2)
ALBUMIN SERPL-MCNC: 4.7 G/DL (ref 3.4–5)
ALP BLD-CCNC: 106 U/L (ref 40–129)
ALT SERPL-CCNC: 58 U/L (ref 10–40)
ANION GAP SERPL CALCULATED.3IONS-SCNC: 16 MMOL/L (ref 3–16)
AST SERPL-CCNC: 29 U/L (ref 15–37)
BILIRUB SERPL-MCNC: 1.1 MG/DL (ref 0–1)
BUN BLDV-MCNC: 19 MG/DL (ref 7–20)
CALCIUM SERPL-MCNC: 9.9 MG/DL (ref 8.3–10.6)
CHLORIDE BLD-SCNC: 105 MMOL/L (ref 99–110)
CHOLESTEROL, TOTAL: 136 MG/DL (ref 0–199)
CO2: 21 MMOL/L (ref 21–32)
CREAT SERPL-MCNC: 0.9 MG/DL (ref 0.9–1.3)
ESTIMATED AVERAGE GLUCOSE: 116.9 MG/DL
GFR SERPL CREATININE-BSD FRML MDRD: >60 ML/MIN/{1.73_M2}
GLUCOSE BLD-MCNC: 96 MG/DL (ref 70–99)
HBA1C MFR BLD: 5.7 %
HDLC SERPL-MCNC: 38 MG/DL (ref 40–60)
LDL CHOLESTEROL CALCULATED: 51 MG/DL
POTASSIUM SERPL-SCNC: 4.4 MMOL/L (ref 3.5–5.1)
SODIUM BLD-SCNC: 142 MMOL/L (ref 136–145)
TOTAL PROTEIN: 7.1 G/DL (ref 6.4–8.2)
TRIGL SERPL-MCNC: 237 MG/DL (ref 0–150)
VLDLC SERPL CALC-MCNC: 47 MG/DL

## 2022-12-29 RX ORDER — NADOLOL 40 MG/1
40 TABLET ORAL DAILY
Qty: 90 TABLET | Refills: 3 | Status: SHIPPED | OUTPATIENT
Start: 2022-12-29

## 2022-12-29 RX ORDER — ASPIRIN 81 MG
TABLET,CHEWABLE ORAL
Qty: 90 TABLET | Refills: 3 | Status: SHIPPED | OUTPATIENT
Start: 2022-12-29

## 2023-01-08 PROCEDURE — 93296 REM INTERROG EVL PM/IDS: CPT | Performed by: INTERNAL MEDICINE

## 2023-01-08 PROCEDURE — 93295 DEV INTERROG REMOTE 1/2/MLT: CPT | Performed by: INTERNAL MEDICINE

## 2023-04-09 PROCEDURE — 93295 DEV INTERROG REMOTE 1/2/MLT: CPT | Performed by: INTERNAL MEDICINE

## 2023-04-09 PROCEDURE — 93296 REM INTERROG EVL PM/IDS: CPT | Performed by: INTERNAL MEDICINE

## 2023-04-24 ENCOUNTER — OFFICE VISIT (OUTPATIENT)
Dept: FAMILY MEDICINE CLINIC | Age: 58
End: 2023-04-24
Payer: COMMERCIAL

## 2023-04-24 VITALS
OXYGEN SATURATION: 95 % | WEIGHT: 190 LBS | HEART RATE: 67 BPM | BODY MASS INDEX: 29.76 KG/M2 | SYSTOLIC BLOOD PRESSURE: 98 MMHG | DIASTOLIC BLOOD PRESSURE: 64 MMHG

## 2023-04-24 DIAGNOSIS — I45.81 PROLONGED QT INTERVAL SYNDROME: Primary | ICD-10-CM

## 2023-04-24 DIAGNOSIS — E78.2 MIXED HYPERLIPIDEMIA: ICD-10-CM

## 2023-04-24 DIAGNOSIS — R73.03 PRE-DIABETES: ICD-10-CM

## 2023-04-24 DIAGNOSIS — R42 DIZZINESS: ICD-10-CM

## 2023-04-24 LAB
ALBUMIN SERPL-MCNC: 4.2 G/DL (ref 3.4–5)
ALBUMIN/GLOB SERPL: 1.7 {RATIO} (ref 1.1–2.2)
ALP SERPL-CCNC: 96 U/L (ref 40–129)
ALT SERPL-CCNC: 72 U/L (ref 10–40)
ANION GAP SERPL CALCULATED.3IONS-SCNC: 12 MMOL/L (ref 3–16)
AST SERPL-CCNC: 35 U/L (ref 15–37)
BASOPHILS # BLD: 0.1 K/UL (ref 0–0.2)
BASOPHILS NFR BLD: 0.9 %
BILIRUB SERPL-MCNC: 0.8 MG/DL (ref 0–1)
BUN SERPL-MCNC: 11 MG/DL (ref 7–20)
CALCIUM SERPL-MCNC: 9.3 MG/DL (ref 8.3–10.6)
CHLORIDE SERPL-SCNC: 101 MMOL/L (ref 99–110)
CHOLEST SERPL-MCNC: 138 MG/DL (ref 0–199)
CO2 SERPL-SCNC: 23 MMOL/L (ref 21–32)
CREAT SERPL-MCNC: 0.8 MG/DL (ref 0.9–1.3)
DEPRECATED RDW RBC AUTO: 13.7 % (ref 12.4–15.4)
EOSINOPHIL # BLD: 0.3 K/UL (ref 0–0.6)
EOSINOPHIL NFR BLD: 4.6 %
GFR SERPLBLD CREATININE-BSD FMLA CKD-EPI: >60 ML/MIN/{1.73_M2}
GLUCOSE SERPL-MCNC: 108 MG/DL (ref 70–99)
HCT VFR BLD AUTO: 46.8 % (ref 40.5–52.5)
HDLC SERPL-MCNC: 37 MG/DL (ref 40–60)
HGB BLD-MCNC: 15.7 G/DL (ref 13.5–17.5)
LDLC SERPL CALC-MCNC: 63 MG/DL
LYMPHOCYTES # BLD: 1.7 K/UL (ref 1–5.1)
LYMPHOCYTES NFR BLD: 29.2 %
MCH RBC QN AUTO: 28.1 PG (ref 26–34)
MCHC RBC AUTO-ENTMCNC: 33.7 G/DL (ref 31–36)
MCV RBC AUTO: 83.4 FL (ref 80–100)
MONOCYTES # BLD: 0.5 K/UL (ref 0–1.3)
MONOCYTES NFR BLD: 9 %
NEUTROPHILS # BLD: 3.4 K/UL (ref 1.7–7.7)
NEUTROPHILS NFR BLD: 56.3 %
PLATELET # BLD AUTO: 227 K/UL (ref 135–450)
PMV BLD AUTO: 9.2 FL (ref 5–10.5)
POTASSIUM SERPL-SCNC: 4.6 MMOL/L (ref 3.5–5.1)
PROT SERPL-MCNC: 6.7 G/DL (ref 6.4–8.2)
RBC # BLD AUTO: 5.61 M/UL (ref 4.2–5.9)
SODIUM SERPL-SCNC: 136 MMOL/L (ref 136–145)
TRIGL SERPL-MCNC: 189 MG/DL (ref 0–150)
VLDLC SERPL CALC-MCNC: 38 MG/DL
WBC # BLD AUTO: 6 K/UL (ref 4–11)

## 2023-04-24 PROCEDURE — 99214 OFFICE O/P EST MOD 30 MIN: CPT

## 2023-04-24 PROCEDURE — 36415 COLL VENOUS BLD VENIPUNCTURE: CPT

## 2023-04-24 RX ORDER — ATORVASTATIN CALCIUM 80 MG/1
80 TABLET, FILM COATED ORAL DAILY
COMMUNITY
Start: 2022-02-10 | End: 2023-04-24 | Stop reason: SDUPTHER

## 2023-04-24 RX ORDER — NADOLOL 20 MG/1
30 TABLET ORAL DAILY
Qty: 45 TABLET | Refills: 1 | Status: SHIPPED | OUTPATIENT
Start: 2023-04-24 | End: 2023-06-23

## 2023-04-24 ASSESSMENT — ENCOUNTER SYMPTOMS
RESPIRATORY NEGATIVE: 1
GASTROINTESTINAL NEGATIVE: 1

## 2023-04-24 ASSESSMENT — PATIENT HEALTH QUESTIONNAIRE - PHQ9
SUM OF ALL RESPONSES TO PHQ QUESTIONS 1-9: 0
SUM OF ALL RESPONSES TO PHQ QUESTIONS 1-9: 0
1. LITTLE INTEREST OR PLEASURE IN DOING THINGS: 0
SUM OF ALL RESPONSES TO PHQ QUESTIONS 1-9: 0
SUM OF ALL RESPONSES TO PHQ9 QUESTIONS 1 & 2: 0
SUM OF ALL RESPONSES TO PHQ QUESTIONS 1-9: 0
2. FEELING DOWN, DEPRESSED OR HOPELESS: 0

## 2023-04-24 NOTE — PROGRESS NOTES
Chief Complaint   Patient presents with    Hyperlipidemia       HPI:  Lazarus Pole is a 60 y.o. (: 1965) here today   for routine office visit. Long QT syndrome/heart palpitations: follow cardiology . Last office visit 10/24/2022. Is on nadolol 40 mg daily, high-dose statin at 80 mg daily Lipitor. Takes aspirin 81 mg daily. Hx of cardiac ablation. Denies ever having any palpitations since having cardiac ablation. Hyperlipidemia: On Lipitor 80 mg daily. Also taking Fish oil but unsure of dosage amount. Sometimes when stands up gets dizzy. Bp stays lower. Blood pressure today 98/64. Looking back to the patient's chart appears she has a tendency of running blood pressures in the upper 90s and low 570Z systolic. Heart rate today 67. Patient's medications, allergies, past medical, surgical, social and family histories were reviewed and updated as appropriate. ROS:  Review of Systems   Constitutional: Negative. HENT: Negative. Respiratory: Negative. Cardiovascular: Negative. Gastrointestinal: Negative. Musculoskeletal: Negative. Skin: Negative. Neurological:  Positive for dizziness. Psychiatric/Behavioral: Negative.            Hemoglobin A1C (%)   Date Value   10/24/2022 5.7     LDL Calculated (mg/dL)   Date Value   10/24/2022 51       Past Medical History:   Diagnosis Date    Heart palpitations     Long Q-T syndrome     Measles     Mumps        Family History   Problem Relation Age of Onset    No Known Problems Mother     Heart Attack Father     Depression Sister     No Known Problems Brother     Cancer Maternal Grandmother     Cancer Maternal Grandfather     Cancer Paternal Grandmother     Diabetes Paternal Grandfather     Other Sister     Depression Sister     Other Brother     No Known Problems Brother     No Known Problems Brother     No Known Problems Brother     No Known Problems Brother     Depression Brother     No Known Problems Brother

## 2023-04-25 LAB
EST. AVERAGE GLUCOSE BLD GHB EST-MCNC: 119.8 MG/DL
HBA1C MFR BLD: 5.8 %

## 2023-05-30 RX ORDER — MOMETASONE FUROATE 1 MG/G
CREAM TOPICAL
Qty: 45 G | Refills: 1 | Status: SHIPPED | OUTPATIENT
Start: 2023-05-30

## 2023-05-30 NOTE — TELEPHONE ENCOUNTER
Patient is asking for a refill on Mometasone Furoate 0.1%. Patient states he uses it for dry skin areas and only uses it a couple of times a week. He was previously prescribed 45gm but would like more if possible. Patient uses [facility].

## 2023-07-06 RX ORDER — ATORVASTATIN CALCIUM 80 MG/1
TABLET, FILM COATED ORAL
Qty: 90 TABLET | Refills: 3 | Status: SHIPPED | OUTPATIENT
Start: 2023-07-06

## 2023-09-15 ENCOUNTER — TELEPHONE (OUTPATIENT)
Dept: CARDIOLOGY | Facility: CLINIC | Age: 58
End: 2023-09-15

## 2023-09-15 NOTE — TELEPHONE ENCOUNTER
Caller: Rachid Jaramillo    Relationship: Self    Best call back number: [phone]    What orders are you requesting (i.e. lab or imaging): STRESS TEST    In what timeframe would the patient need to come in: ASAP    Where will you receive your lab/imaging services: PT HAS ORDERS IN CHART FOR STRESS TEST AND WOULD LIKE TO HAVE THAT STRESS TEST DONE AT [facility] IN [place], [place].     Additional notes: PT STATES HE CANCELLED APPT IN [place] DUE TO COST ISSUES. PLEASE CALL [facility]. THANK YOU

## 2023-10-06 NOTE — TELEPHONE ENCOUNTER
Patient called and left a message in regards to the results of his stress test.    I tried to call him back at both numbers that were provided, but he did not answer. I left messages to let him know that Dr. Welsh is out of the office this week, but his results are on his desk to review.

## 2023-10-12 ENCOUNTER — TELEPHONE (OUTPATIENT)
Dept: CARDIOLOGY | Facility: CLINIC | Age: 58
End: 2023-10-12

## 2023-10-12 NOTE — TELEPHONE ENCOUNTER
Patient left message on [facility].  I attempted to call the patient @ [phone], no answer.  I did leave message on VM to call us back.

## 2023-10-12 NOTE — TELEPHONE ENCOUNTER
"Per Dr. Welsh, \"Please call patient and notify him that his stress test was normal.\"       I attempted to call the patient. Patient did not answer. I left a message for a return call.   "

## 2023-10-13 ENCOUNTER — TELEPHONE (OUTPATIENT)
Dept: CARDIOLOGY | Facility: CLINIC | Age: 58
End: 2023-10-13

## 2023-10-17 ENCOUNTER — TELEPHONE (OUTPATIENT)
Dept: CARDIOLOGY | Facility: CLINIC | Age: 58
End: 2023-10-17

## 2023-10-17 NOTE — TELEPHONE ENCOUNTER
Caller: Rachid Jaramillo    Relationship: Self    Best call back number: [phone]    What is the best time to reach you: AFTERNOON    Who are you requesting to speak with (clinical staff, provider,  specific staff member): ANY        What was the call regarding: PATIENT CALLED IN REGARDS TO THE RESULTS OF HIS STRESS TEST THAT WAS COMPLETED 10-02-23 AT [facility]. PLEASE CONTACT PATIENT IN REGARDS TO THE RESULTS.    Is it okay if the provider responds through MyChart: PHONE CALL PLEASE.

## 2023-10-17 NOTE — TELEPHONE ENCOUNTER
"Per Dr. Welsh, \"Please call patient and notify him that his stress test was normal.\"               Patient notified and voices understanding.   "

## 2023-10-23 ENCOUNTER — OFFICE VISIT (OUTPATIENT)
Dept: FAMILY MEDICINE CLINIC | Age: 58
End: 2023-10-23
Payer: COMMERCIAL

## 2023-10-23 VITALS
OXYGEN SATURATION: 94 % | HEART RATE: 76 BPM | WEIGHT: 194.4 LBS | SYSTOLIC BLOOD PRESSURE: 118 MMHG | BODY MASS INDEX: 30.45 KG/M2 | DIASTOLIC BLOOD PRESSURE: 78 MMHG

## 2023-10-23 DIAGNOSIS — I45.81 PROLONGED QT INTERVAL SYNDROME: Primary | ICD-10-CM

## 2023-10-23 DIAGNOSIS — R73.03 PRE-DIABETES: ICD-10-CM

## 2023-10-23 DIAGNOSIS — E78.2 MIXED HYPERLIPIDEMIA: ICD-10-CM

## 2023-10-23 LAB
ALBUMIN SERPL-MCNC: 4.4 G/DL (ref 3.4–5)
ALBUMIN/GLOB SERPL: 1.8 {RATIO} (ref 1.1–2.2)
ALP SERPL-CCNC: 91 U/L (ref 40–129)
ALT SERPL-CCNC: 67 U/L (ref 10–40)
ANION GAP SERPL CALCULATED.3IONS-SCNC: 8 MMOL/L (ref 3–16)
AST SERPL-CCNC: 30 U/L (ref 15–37)
BILIRUB SERPL-MCNC: 1.3 MG/DL (ref 0–1)
BUN SERPL-MCNC: 13 MG/DL (ref 7–20)
CALCIUM SERPL-MCNC: 9.3 MG/DL (ref 8.3–10.6)
CHLORIDE SERPL-SCNC: 103 MMOL/L (ref 99–110)
CHOLEST SERPL-MCNC: 118 MG/DL (ref 0–199)
CO2 SERPL-SCNC: 26 MMOL/L (ref 21–32)
CREAT SERPL-MCNC: 0.8 MG/DL (ref 0.9–1.3)
GFR SERPLBLD CREATININE-BSD FMLA CKD-EPI: >60 ML/MIN/{1.73_M2}
GLUCOSE SERPL-MCNC: 89 MG/DL (ref 70–99)
HDLC SERPL-MCNC: 36 MG/DL (ref 40–60)
LDLC SERPL CALC-MCNC: 52 MG/DL
POTASSIUM SERPL-SCNC: 4.5 MMOL/L (ref 3.5–5.1)
PROT SERPL-MCNC: 6.8 G/DL (ref 6.4–8.2)
SODIUM SERPL-SCNC: 137 MMOL/L (ref 136–145)
TRIGL SERPL-MCNC: 152 MG/DL (ref 0–150)
VLDLC SERPL CALC-MCNC: 30 MG/DL

## 2023-10-23 PROCEDURE — 36415 COLL VENOUS BLD VENIPUNCTURE: CPT

## 2023-10-23 PROCEDURE — 99213 OFFICE O/P EST LOW 20 MIN: CPT

## 2023-10-23 RX ORDER — NADOLOL 20 MG/1
30 TABLET ORAL DAILY
Qty: 45 TABLET | Refills: 3 | Status: SHIPPED | OUTPATIENT
Start: 2023-10-23 | End: 2024-02-20

## 2023-10-23 ASSESSMENT — ENCOUNTER SYMPTOMS
RESPIRATORY NEGATIVE: 1
GASTROINTESTINAL NEGATIVE: 1

## 2023-10-23 NOTE — PROGRESS NOTES
Chief Complaint   Patient presents with    6 Month Follow-Up       HPI:  Guru Werner is a 60 y.o. (: 1965) here today   for routine office visit. Long QT: Follows  for cardiology care. Is on nadolol 30mg daily and high-dose statin 80 mg daily Lipitor. Also takes aspirin 81 mg daily. History of cardiac ablation. Hyperlipidemia: On Lipitor 80 mg daily. Also takes fish oil. Is prediabetic. Patient's medications, allergies, past medical, surgical, social and family histories were reviewed and updated as appropriate. ROS:  Review of Systems   Constitutional: Negative. HENT: Negative. Respiratory: Negative. Cardiovascular: Negative. Gastrointestinal: Negative. Neurological: Negative. Psychiatric/Behavioral: Negative.              Hemoglobin A1C (%)   Date Value   2023 5.8     LDL Calculated (mg/dL)   Date Value   2023 63       Past Medical History:   Diagnosis Date    Heart palpitations     Long Q-T syndrome     Measles     Mumps        Family History   Problem Relation Age of Onset    No Known Problems Mother     Heart Attack Father     Depression Sister     No Known Problems Brother     Cancer Maternal Grandmother     Cancer Maternal Grandfather     Cancer Paternal Grandmother     Diabetes Paternal Grandfather     Other Sister     Depression Sister     Other Brother     No Known Problems Brother     No Known Problems Brother     No Known Problems Brother     No Known Problems Brother     Depression Brother     No Known Problems Brother        Social History     Socioeconomic History    Marital status:      Spouse name: Not on file    Number of children: Not on file    Years of education: Not on file    Highest education level: Not on file   Occupational History    Not on file   Tobacco Use    Smoking status: Never    Smokeless tobacco: Never   Substance and Sexual Activity    Alcohol use: No    Drug use: Yes    Sexual activity: Not on file

## 2023-10-24 LAB
EST. AVERAGE GLUCOSE BLD GHB EST-MCNC: 111.2 MG/DL
HBA1C MFR BLD: 5.5 %

## 2024-01-17 RX ORDER — ASPIRIN 81 MG
TABLET,CHEWABLE ORAL
Qty: 90 TABLET | Refills: 2 | Status: SHIPPED | OUTPATIENT
Start: 2024-01-17

## 2024-07-11 RX ORDER — ATORVASTATIN CALCIUM 80 MG/1
TABLET, FILM COATED ORAL
Qty: 90 TABLET | Refills: 3 | Status: SHIPPED | OUTPATIENT
Start: 2024-07-11

## 2024-07-15 ENCOUNTER — TELEPHONE (OUTPATIENT)
Dept: CARDIOLOGY | Facility: CLINIC | Age: 59
End: 2024-07-15

## 2024-07-15 ENCOUNTER — TELEPHONE (OUTPATIENT)
Dept: FAMILY MEDICINE CLINIC | Age: 59
End: 2024-07-15

## 2024-07-15 NOTE — TELEPHONE ENCOUNTER
----- Message from Kindra Lauren sent at 7/15/2024  3:28 PM EDT -----  Regarding: ECC Referral Request  ECC Referral Request    Reason for referral request: Specialty Provider    Specialist/Lab/Test patient is requesting (if known):    Specialist Phone Number (if applicable):    Additional Information : patient wanted to have a referral to the orthopedic doctor   From his pcp Wood Arreguin APRN - CNP    --------------------------------------------------------------------------------------------------------------------------    Relationship to Patient: Self     Call Back Information: OK to leave message on voicemail  Preferred Call Back Number: Phone [phone] (home)

## 2024-07-15 NOTE — TELEPHONE ENCOUNTER
I spoke with Mr Jaramillo and explained MRI protocol. He has only been seen by a chiropractor and will call his PCP for a referral to an Orthopedic or ask his PCP to order the MRI.

## 2024-07-15 NOTE — TELEPHONE ENCOUNTER
Caller: Rachid Jaramillo    Relationship: Self    Best call back number: [phone]     What is the best time to reach you: ANY    Who are you requesting to speak with (clinical staff, provider,  specific staff member): CLINICAL      What was the call regarding: PATIENT CALLED TO ADVISE THAT HE TOOK A FALL IN JUNE AND BELIEVES HE MAY HAVE TORE A ROTATOR CUFF. PATIENT STATES HE IS IN NEED OF HAVING HIS PACEMAKER PUT INTO SAFE MODE, AND IS REQUESTING AN ORDER FOR AN MRI. PLEASE CONTACT PATIENT AND ADVISE ON THIS ISSUE.    Is it okay if the provider responds through [employer]: PLEASE CALL

## 2024-07-16 DIAGNOSIS — M25.511 RIGHT SHOULDER PAIN, UNSPECIFIED CHRONICITY: Primary | ICD-10-CM

## 2024-07-16 NOTE — TELEPHONE ENCOUNTER
Depending why they are wanting to see Ortho would recommend  for shoulder, knee, hip.  If need to see hand wrist elbow specialist would recommend Dr. Sheldon

## 2024-07-23 ENCOUNTER — TELEPHONE (OUTPATIENT)
Dept: ORTHOPEDIC SURGERY | Age: 59
End: 2024-07-23

## 2024-07-23 ENCOUNTER — OFFICE VISIT (OUTPATIENT)
Dept: ORTHOPEDIC SURGERY | Age: 59
End: 2024-07-23
Payer: COMMERCIAL

## 2024-07-23 VITALS — BODY MASS INDEX: 30.45 KG/M2 | WEIGHT: 194 LBS | HEIGHT: 67 IN

## 2024-07-23 DIAGNOSIS — M25.511 RIGHT SHOULDER PAIN, UNSPECIFIED CHRONICITY: Primary | ICD-10-CM

## 2024-07-23 PROCEDURE — 99203 OFFICE O/P NEW LOW 30 MIN: CPT | Performed by: STUDENT IN AN ORGANIZED HEALTH CARE EDUCATION/TRAINING PROGRAM

## 2024-07-23 NOTE — PROGRESS NOTES
Date:  2024    Name:  Jayesh Bassett  Address:  [address] [place] [id number]    :  1965      Age:   59 y.o.    SSN:  [id number]      Medical Record Number:  [id number]    Reason for Visit:    Chief Complaint    Shoulder Pain (RIGHT SHOULDER)      DOS:2024     HPI: Jayesh Bassett is a 59 y.o. male here today for new patient evaluation regarding his right shoulder.  The patient is right-hand dominant.  The patient lives in an [facility].  The patient reports that back on 2024 he tripped and landed directly onto his shoulder.  He noted immediate pain and difficulty with range of motion overhead and away from his body.  He has been sleeping in a recliner since the injury due to the pain.  He has been able to continue his work as a  but he has had to modify because he cannot lift his arm away from his body.  He denies any numbness and tingling.  The patient does have a history of pacemaker placement which he reports is MRI conditional      Pain Assessment  Location of Pain: Shoulder  Location Modifiers: Right  Severity of Pain: 1  Quality of Pain: Aching  Frequency of Pain: Intermittent  ROS: All systems reviewed on patient intake form.  Pertinent items are noted in HPI.        Past Medical History:   Diagnosis Date    Heart palpitations     Long Q-T syndrome     Measles     Mumps         Past Surgical History:   Procedure Laterality Date    HERNIA REPAIR Left 2017    HERNIA INGUINAL REPAIR with mesh performed by Jb Olsen MD at [facility]    INGUINAL HERNIA REPAIR Left 2017       Family History   Problem Relation Age of Onset    No Known Problems Mother     Heart Attack Father     Depression Sister     No Known Problems Brother     Cancer Maternal Grandmother     Cancer Maternal Grandfather     Cancer Paternal Grandmother     Diabetes Paternal Grandfather     Other Sister     Depression Sister     Other Brother     No Known Problems Brother     No

## 2024-07-23 NOTE — TELEPHONE ENCOUNTER
MRI APPROVAL RIGHT SHOULDER. PATIENT PATIENT TO CALL AT THEIR CONVENIENCE TO SCHEDULE THAT MRI. ALSO, PATIENT TO CALL OUR SCHEDULING DEPT TO SCHEDULE FOLLOW UP APPOINTMENT  WITH DR TAN TO GO OVER THOSE RESULTS, LEAVING AT LEAST 2-3 DAYS FOR OUR OFFICE TO RECEIVE THEIR RESULTS.

## 2024-08-02 ENCOUNTER — TELEPHONE (OUTPATIENT)
Dept: CARDIOLOGY | Facility: CLINIC | Age: 59
End: 2024-08-02

## 2024-08-02 NOTE — TELEPHONE ENCOUNTER
Called Mr Jaramillo to let him know his device is MRI compatible. Left message and if any further question left my name and number.

## 2024-08-02 NOTE — TELEPHONE ENCOUNTER
Caller: Rachid Jaramillo    Relationship: Self    Best call back number: [phone]    What is the best time to reach you: ANYTIME    Who are you requesting to speak with (clinical staff, provider,  specific staff member): CLINICAL STAFF      What was the call regarding: PT IS WANTING TO GET THE OKAY FROM DR. MCCULLOUGH TO HAVE MRI. (HAS A PACEMAKER AND DEFIB). IF HE CAN GO AHEAD AND SCHEDULE, HE CAN GO IN ANYTIME NEXT WEEK IN THE AM. ORTHOPEDIC GAVE THE OKAY FOR THE MRI, BUT NEEDS THAT OKAY FROM DR. MCCULLOUGH AS WELL.     Is it okay if the provider responds through [employer]: PLEASE CALL PT. VM IS OKAY

## 2024-08-02 NOTE — TELEPHONE ENCOUNTER
Mr Ella returned my call and let him know that the ordering physician or facility needs to fax us the form to clear him for MRI.  Gave fax # [phone].

## 2024-08-15 ENCOUNTER — OFFICE VISIT (OUTPATIENT)
Dept: CARDIOLOGY | Facility: CLINIC | Age: 59
End: 2024-08-15
Payer: MEDICAID

## 2024-08-15 VITALS
DIASTOLIC BLOOD PRESSURE: 72 MMHG | WEIGHT: 185 LBS | BODY MASS INDEX: 27.4 KG/M2 | OXYGEN SATURATION: 97 % | HEART RATE: 69 BPM | SYSTOLIC BLOOD PRESSURE: 104 MMHG | HEIGHT: 69 IN

## 2024-08-15 DIAGNOSIS — I48.0 PAROXYSMAL ATRIAL FIBRILLATION: ICD-10-CM

## 2024-08-15 DIAGNOSIS — I25.10 CORONARY ARTERY DISEASE INVOLVING NATIVE CORONARY ARTERY OF NATIVE HEART WITHOUT ANGINA PECTORIS: ICD-10-CM

## 2024-08-15 DIAGNOSIS — I47.10 SUSTAINED SVT: ICD-10-CM

## 2024-08-15 DIAGNOSIS — I45.81 LONG Q-T SYNDROME: Primary | ICD-10-CM

## 2024-08-15 NOTE — PROGRESS NOTES
Rachid Jaramillo  1965  [phone]    08/15/2024    [facility] CARDIOLOGY     Referring Provider: No ref. provider found     Provider, No Known  [place] [id number]    Chief Complaint   Patient presents with    Long QT Syndrome         Problem List:      Long QT Syndrome  48 Hour Holter 6/25/18: 47 127 bpm, Average 77 bpm. Sinus Tachycarida 127 bpm. Rare PVCs. No symtpoms during monitor.   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)   MCOT 10/2019: no atrial fibrillation or VT/VF  Admission at  2/2022 for SVT:  [facility] 2/8/2022: LAD with proximal vessel lesion 60% stenosis, moderate disease in the proximal LAD, RFR and FFR negative, treated medically  Echocardiogram 2/10/2022 EF 50 to 55%, trivial pericardial effusion  Medtronic DDD ICD implant 2/9/2022 due to episode of near syncope likely cardiogenic in origin, family history Long QT, and sinus bradycardia, complicated by postoperative pneumothorax requiring chest tube  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 Song Rogers MD at  2/16/2022  Paroxysmal atrial fibrillation  CHADSVasc = 1 (CAD)  New diagnosis found by device interrogation noted on 7/16/2020 for 24-hour episode-asymptomatic  Family History of LQTS Type 1 (sister)   Surgical History  inguinal hernia left repair    Allergies  No Known Allergies    Current Medications    Current Outpatient Medications:     aspirin 81 MG chewable tablet, Chew 1 tablet Daily., Disp: , Rfl:     atorvastatin (LIPITOR) 80 MG tablet, Take 1 tablet by mouth Daily., Disp: , Rfl:     multivitamin with minerals tablet tablet, Take 1 tablet by mouth Daily., Disp: , Rfl:     nadolol (CORGARD) 40 MG tablet, TAKE 1 TABLET BY MOUTH DAILY AT BEDTIME, Disp: 90 tablet, Rfl: 3    apixaban (ELIQUIS) 5 MG tablet tablet, Take 1 tablet by mouth 2 (Two) Times a Day., Disp: 60 tablet, Rfl: 6    History  "of Present Illness:     Pt presents for follow up of LQTS, ICD check.  Since we last saw the pt, pt denies any AF episodes, SOB, CP, LH, and dizziness. Denies any hospitalizations, ER visits, bleeding, or TIA/CVA symptoms. Overall feels well. Recently fell on shoulder and needs MRI.           Vitals:    08/15/24 1111   BP: 104/72   BP Location: Left arm   Patient Position: Sitting   Pulse: 69   SpO2: 97%   Weight: 83.9 kg (185 lb)   Height: 175.3 cm (69\")     Body mass index is 27.32 kg/m².  PE:  General: NAD. A & O x 3   Neck: no JVD, no carotid bruits, no TM  Heart RRR, NL S1, S2, S4 present, no rubs, murmurs  Lungs: CTA, no wheezes, rhonchi, or rales  Abd: soft, non-tender, NL BS  Ext: No musculoskeletal deformities, no edema, cyanosis, or clubbing  Psych: normal mood and affect    Diagnostic Data:    ICD manual interrogation: Normal function RA paced 17% RV paced 0%, normal thresholds and impedances, 8 years on battery, 1 episode of atrial fibrillation lasting approximately 24 hours occurring on 7/16/2024      ECG 12 Lead    Date/Time: 8/15/2024 11:33 AM  Performed by: Anne Marie Kiran PA    Authorized by: Anne Marie Kiran PA  Comparison: compared with previous ECG from 10/20/2022  Similar to previous ECG  Comparison to previous ECG: QTc is 400 ms  Rhythm: sinus rhythm  BPM: 60                 1. Long Q-T syndrome    2. Sustained SVT    3. Coronary artery disease involving native coronary artery of native heart without angina pectoris    4. Paroxysmal atrial fibrillation          Plan:  1.  Long QT syndrome: QTc stable today on EKG  - on Nadolol 20 mg daily.   - 2/2022: ICD.  Normal interrogation today     2.  AVNRT:  -S/p AVNRT ablation at  2/16/2022. No recurrences.     3. CAD: on lipitor, ASA, BBL  - LHC 2022: 60% LAD, treated medically. Recent stress test normal, [facility]. Will call and get records.       4. Paroxysmal Atrial Fibrillation:  -New diagnosis, noted on device " interrogation today atrial fibrillation lasting 24 hours on 7/16/2024  - CHADsvasc = 1 (CAD)   -Will start Eliquis 5 mg twice daily as has a episode was 24 hours in duration.  The patient is agreeable to start this medication.  Turned on A-fib alerts today on his Medtronic ICD.      F/up in 6 months    Electronically signed by DARCI Choudhary, 08/15/24, 11:45 AM EDT.

## 2024-08-16 ENCOUNTER — HOSPITAL ENCOUNTER (OUTPATIENT)
Dept: MRI IMAGING | Age: 59
Discharge: HOME | End: 2024-08-16
Attending: STUDENT IN AN ORGANIZED HEALTH CARE EDUCATION/TRAINING PROGRAM
Payer: COMMERCIAL

## 2024-08-16 DIAGNOSIS — M25.511 RIGHT SHOULDER PAIN, UNSPECIFIED CHRONICITY: ICD-10-CM

## 2024-08-16 PROCEDURE — 73221 MRI JOINT UPR EXTREM W/O DYE: CPT

## 2024-08-19 ENCOUNTER — TELEPHONE (OUTPATIENT)
Dept: CARDIOLOGY | Facility: CLINIC | Age: 59
End: 2024-08-19
Payer: MEDICAID

## 2024-08-19 NOTE — TELEPHONE ENCOUNTER
Patient was recently started on Eliquis 5 mg BID. His pharmacist called to see if we could change him to a cheaper blood thinner? Eliquis and Xarelto will cost him over $600 per month. He would like to know if you would like to change him to warfarin or generic Pradaxa? Generic Pradaxa will cost $120 per month.           Nikunj at [facility]  (P)[phone]

## 2024-08-20 ENCOUNTER — TELEPHONE (OUTPATIENT)
Dept: ORTHOPEDIC SURGERY | Age: 59
End: 2024-08-20

## 2024-08-20 RX ORDER — DABIGATRAN ETEXILATE 150 MG/1
150 CAPSULE ORAL 2 TIMES DAILY
Qty: 60 CAPSULE | Refills: 6 | OUTPATIENT
Start: 2024-08-20

## 2024-08-20 NOTE — TELEPHONE ENCOUNTER
----- Message from Dr. Hugo Carlos DO sent at 8/20/2024  3:16 PM EDT -----  MRI shows full thickness cuff tear that appears reparable. Recommend follow up ASAP to discuss surgical options.    AK  ----- Message -----  From: Ruslan Guaman Incoming Radiology Results From [facility]  Sent: 8/20/2024   2:14 PM EDT  To: Hugo Carlos DO

## 2024-08-20 NOTE — TELEPHONE ENCOUNTER
Called patient and left a message to let him know about his mri test results. Left a call back number.

## 2024-08-21 ENCOUNTER — TELEPHONE (OUTPATIENT)
Dept: ORTHOPEDIC SURGERY | Age: 59
End: 2024-08-21

## 2024-08-21 NOTE — TELEPHONE ENCOUNTER
Patient called me back and we discussed his MRI findings very briefly, he would like me to send the report to [facility] because his [facility] has something set up with them, he is going to call back tomorrow with the fax number.

## 2024-08-26 ENCOUNTER — TELEPHONE (OUTPATIENT)
Dept: ORTHOPEDIC SURGERY | Age: 59
End: 2024-08-26

## 2024-08-26 NOTE — TELEPHONE ENCOUNTER
Called patient and left a message to let him know I was able to send the faxes and they went through to both numbers.  
IV discontinued, cath removed intact

## 2024-09-03 ENCOUNTER — TELEPHONE (OUTPATIENT)
Dept: ORTHOPEDIC SURGERY | Age: 59
End: 2024-09-03

## 2024-09-03 NOTE — TELEPHONE ENCOUNTER
Called patient and left a message and gave him our call center number as I am not always in my office to answer my phone, I also let him know I faxed mri over again to [facility].

## 2024-09-04 ENCOUNTER — TELEPHONE (OUTPATIENT)
Dept: ORTHOPEDIC SURGERY | Age: 59
End: 2024-09-04

## 2024-09-04 NOTE — TELEPHONE ENCOUNTER
General Question     Subject: MRI DISC  Patient and /or Facility Request: ANN-MARIE NEVAREZ  Contact Number: [phone]    PATIENT CALLED TO SPEAK WITH CLINICAL TO INQUIRE OF MRI DISC, WHERE HE COULD  DISC FOR MRI.     PATIENT IS LOOKING TO SUBMIT INFORMATION IS [facility].     PATIENT WOULD LIKE TO RECEIVE A CALL BEFORE 5-CLOSING.    PLEASE ADVISE

## 2024-09-05 NOTE — TELEPHONE ENCOUNTER
Called patient back to let him know that I am getting his disc made up and will mail it out to him

## 2024-09-06 NOTE — PROGRESS NOTES
"Rachid Jaramillo  1965  [phone]    02/20/2020    [facility] CARDIOLOGY     Provider, No Known  [place] [id number]    Chief Complaint   Patient presents with   • Long QT Syndrome       Problem List:     1. Long QT Syndrome  a. 48 Hour Holter 6/25/18: 47 127 bpm, Average 77 bpm. Sinus Tachycarida 127 bpm. Rare PVCs. No symtpoms during monitor.   b. Echocardiogram 1/28/19: EF 60-65%, no valvular abnormalities  c. Positive Genetic Testing for LQTS Type 1  d. MCOT 10/2019: no atrial fibrillation or VT/VF  2. Family History of LQTS (sister)   3. Surgical History  a. inguinal hernia left repair    Allergies  No Known Allergies    Current Medications    Current Outpatient Medications:   •  nadolol (CORGARD) 40 MG tablet, Take 1 tablet by mouth Daily. AT BEDTIME, Disp: 30 tablet, Rfl: 11    History of Present Illness     Pt presents for follow up of Long QT Syndrome. Since we last saw the pt, pt denies any AF episodes and palpitations, SOB, CP, LH, and dizziness. Denies any hospitalizations, ER visits, bleeding, or TIA/CVA symptoms. Overall feels well.    ROS:  General:  Denies fatigue, weight gain or loss  Cardiovascular:  Denies CP, PND, syncope, near syncope, edema or palpitations.  Pulmonary:  Denies MONTES, cough, or wheezing      Vitals:    02/20/20 1337   BP: 108/72   BP Location: Right arm   Patient Position: Sitting   Pulse: 58   Weight: 81.6 kg (180 lb)   Height: 175.3 cm (69\")     Body mass index is 26.58 kg/m².  PE:  General: NAD  Neck: no JVD, no carotid bruits, no TM  Heart RRR, NL S1, S2, S4 present, no rubs, murmurs  Lungs: CTA, no wheezes, rhonchi, or rales  Abd: soft, non-tender, NL BS  Ext: No musculoskeletal deformities, no edema, cyanosis, or clubbing  Psych: normal mood and affect    Diagnostic Data:        ECG 12 Lead  Date/Time: 2/20/2020 2:00 PM  Performed by: Anand Welsh MD  Authorized by: Anand Welsh MD   Comparison: compared with " Established Patient - Audio Only Telehealth Visit     The patient location is: his home in [place], [place]  The chief complaint leading to consultation is: s/p right anterior hip replacement  Visit type: Virtual visit with audio only (telephone)  Total time spent with patient: 10 minutes       The reason for the audio only service rather than synchronous audio and video virtual visit was related to technical difficulties or patient preference/necessity.     Each patient to whom I provide medical services by telemedicine is:  (1) informed of the relationship between the physician and patient and the respective role of any other health care provider with respect to management of the patient; and (2) notified that they may decline to receive medical services by telemedicine and may withdraw from such care at any time. Patient verbally consented to receive this service via voice-only telephone call.     The patient had his right hip replaced by Dr. Adames on 9/3. He was discharged the next day. He reports that he's doing great. He c/o minimal sticking pain occasionally around his incision which he takes oxycodone for. His bowels are moving. Home Health started yesterday. He is doing exercises between HH visits. He has his blue wristband and will call as needed.                        This service was not originating from a related E/M service provided within the previous 7 days nor will  to an E/M service or procedure within the next 24 hours or my soonest available appointment.  Prevailing standard of care was able to be met in this audio-only visit.           previous ECG from 8/15/2019  Similar to previous ECG  Comparison to previous ECG: QTc 440 ms   Rhythm: sinus rhythm  BPM: 58              1. Long Q-T syndrome          Plan:  1. Long QT Syndrome:  - doing well continue on Nadolol    F/up in 12 months    Electronically signed by DARCI Choudhary, 02/20/20, 2:01 PM.    I, Anand Welsh MD, personally performed the services described in this documentation as scribed by the above named individual in my presence, and it is both accurate and complete.  2/20/2020  2:06 PM

## 2024-09-19 ENCOUNTER — TELEPHONE (OUTPATIENT)
Dept: CARDIOLOGY | Facility: CLINIC | Age: 59
End: 2024-09-19

## 2024-09-19 NOTE — TELEPHONE ENCOUNTER
Patient is requesting cardiac clearance to have shoulder surgery on 12/5/24. How long can he hold Aspirin and Pradaxa prior to?            Patient left this request on a message. I tried to call him back but he did not answer. I left a message letting him know that I did receive his clearance request.

## 2024-09-19 NOTE — TELEPHONE ENCOUNTER
Caller: MARILYNN PEREZ    Relationship to patient: Emergency Contact    Best call back number: [phone]     Type of visit: FOLLOW UP     Requested date: 30 DAYS PRIOR TO SURGERY ON 12/05/24    Additional notes:PATIENT IS HAVING SHOULDER SURGERY ON 12/05/24 AND NEEDS AN APPOINTMENT FOR CARDIAC CLEARANCE. PATIENT WILL NEED TO STOP TAKING HIS BLOOD THINNER A FEW WEEKS BEFORE SURGERY HIS WIFE BELIEVES SO THEY NEED THE APPOINTMENT TO BE 30 DAYS BEFORE THE SURGERY.

## 2024-09-26 NOTE — TELEPHONE ENCOUNTER
I tried to call the patient back but he did not answer. I left a message for him to call me back at the office.

## 2024-09-27 ENCOUNTER — TELEPHONE (OUTPATIENT)
Dept: CARDIOLOGY | Facility: CLINIC | Age: 59
End: 2024-09-27
Payer: MEDICAID

## 2024-11-07 DIAGNOSIS — I45.81 PROLONGED QT INTERVAL SYNDROME: ICD-10-CM

## 2024-11-07 RX ORDER — NADOLOL 20 MG/1
TABLET ORAL
Qty: 45 TABLET | Refills: 1 | Status: SHIPPED | OUTPATIENT
Start: 2024-11-07

## 2024-11-07 RX ORDER — ASPIRIN 81 MG
TABLET,CHEWABLE ORAL
Qty: 90 TABLET | Refills: 0 | Status: SHIPPED | OUTPATIENT
Start: 2024-11-07

## 2024-11-21 ENCOUNTER — OFFICE VISIT (OUTPATIENT)
Dept: FAMILY MEDICINE CLINIC | Age: 59
End: 2024-11-21

## 2024-11-21 VITALS
HEIGHT: 67 IN | BODY MASS INDEX: 30.29 KG/M2 | HEART RATE: 74 BPM | OXYGEN SATURATION: 97 % | WEIGHT: 193 LBS | SYSTOLIC BLOOD PRESSURE: 110 MMHG | DIASTOLIC BLOOD PRESSURE: 72 MMHG

## 2024-11-21 DIAGNOSIS — Z01.818 PREOP EXAMINATION: Primary | ICD-10-CM

## 2024-11-21 DIAGNOSIS — I48.0 PAF (PAROXYSMAL ATRIAL FIBRILLATION) (HCC): ICD-10-CM

## 2024-11-21 DIAGNOSIS — S46.011S TRAUMATIC TEAR OF RIGHT ROTATOR CUFF, UNSPECIFIED TEAR EXTENT, SEQUELA: ICD-10-CM

## 2024-11-21 DIAGNOSIS — I47.19 AVNRT (AV NODAL RE-ENTRY TACHYCARDIA) (HCC): ICD-10-CM

## 2024-11-21 DIAGNOSIS — I25.10 CORONARY ARTERY DISEASE INVOLVING NATIVE CORONARY ARTERY OF NATIVE HEART WITHOUT ANGINA PECTORIS: ICD-10-CM

## 2024-11-21 DIAGNOSIS — I45.81 LONG Q-T SYNDROME: ICD-10-CM

## 2024-11-21 RX ORDER — DABIGATRAN ETEXILATE 150 MG/1
150 CAPSULE ORAL 2 TIMES DAILY
COMMUNITY
Start: 2024-08-20

## 2024-11-21 ASSESSMENT — PATIENT HEALTH QUESTIONNAIRE - PHQ9: DEPRESSION UNABLE TO ASSESS: PT REFUSES

## 2024-11-21 ASSESSMENT — ENCOUNTER SYMPTOMS: SHORTNESS OF BREATH: 0

## 2024-11-21 NOTE — PROGRESS NOTES
Jayesh Bassett  YOB: 1965    Date of Service:  11/21/2024      Wt Readings from Last 2 Encounters:   11/21/24 87.5 kg (193 lb)   07/23/24 88 kg (194 lb)       BP Readings from Last 3 Encounters:   11/21/24 110/72   10/23/23 118/78   04/24/23 98/64        Chief Complaint   Patient presents with    Pre-op Exam     Patient is having a right shoulder arthroscopy on 12/5/24 by Dr. Diggs at [facility].       No Known Allergies    Outpatient Medications Marked as Taking for the 11/21/24 encounter (Office Visit) with Marky Tatum MD   Medication Sig Dispense Refill    dabigatran (PRADAXA) 150 MG capsule Take 1 capsule by mouth 2 times daily      nadolol (CORGARD) 20 MG tablet TAKE 1 & 1/2 TABLETS BY MOUTH DAILY 45 tablet 1    ASPIRIN LOW DOSE 81 MG chewable tablet CHEW 1 TABLET AND SWALLOW EVERY MORNING 90 tablet 0    atorvastatin (LIPITOR) 80 MG tablet TAKE 1 TABLET BY MOUTH AT BEDTIME 90 tablet 3    albuterol (PROVENTIL) (2.5 MG/3ML) 0.083% nebulizer solution Take 3 mLs by nebulization every 6 hours as needed for Wheezing 120 each 3    Multiple Vitamins-Minerals (THERAPEUTIC MULTIVITAMIN-MINERALS) tablet Take 1 tablet by mouth daily      NONFORMULARY daily Herbal for Prostate Health         This patient presents to the office today for a preoperative consultation at the request of surgeon, Dr. Diggs, who plans on performing a right shoulder arthroscopy on December 5 at [facility].  The current problem began5 months ago, and symptoms have been stable with time.  Conservative therapy:  pain improved.  Rom improved.   .    Had fall w/ injury/ rtc tear in June.  Seen by ortho.  Rec arthroscopy.      Hx of paf.  Now on pradaxa for past several days.      Planned anesthesia: General   Known anesthesia problems: None   Bleeding risk: Anticoagulant therapy- pradaxa and asa  Personal or FH of DVT/PE: No    Patient objection to receiving blood products: No    Past Medical History:   Diagnosis Date

## 2024-11-22 LAB
ANION GAP SERPL CALCULATED.3IONS-SCNC: 9 MMOL/L (ref 3–16)
BASOPHILS # BLD: 0.1 K/UL (ref 0–0.2)
BASOPHILS NFR BLD: 1 %
BUN SERPL-MCNC: 16 MG/DL (ref 7–20)
CALCIUM SERPL-MCNC: 9.4 MG/DL (ref 8.3–10.6)
CHLORIDE SERPL-SCNC: 102 MMOL/L (ref 99–110)
CO2 SERPL-SCNC: 27 MMOL/L (ref 21–32)
CREAT SERPL-MCNC: 0.8 MG/DL (ref 0.8–1.3)
DEPRECATED RDW RBC AUTO: 14.5 % (ref 12.4–15.4)
EOSINOPHIL # BLD: 0.4 K/UL (ref 0–0.6)
EOSINOPHIL NFR BLD: 5.4 %
GFR SERPLBLD CREATININE-BSD FMLA CKD-EPI: >90 ML/MIN/{1.73_M2}
GLUCOSE SERPL-MCNC: 79 MG/DL (ref 70–99)
HCT VFR BLD AUTO: 47.9 % (ref 40.5–52.5)
HGB BLD-MCNC: 16.1 G/DL (ref 13.5–17.5)
LYMPHOCYTES # BLD: 1.5 K/UL (ref 1–5.1)
LYMPHOCYTES NFR BLD: 22.6 %
MCH RBC QN AUTO: 28.3 PG (ref 26–34)
MCHC RBC AUTO-ENTMCNC: 33.6 G/DL (ref 31–36)
MCV RBC AUTO: 84.2 FL (ref 80–100)
MONOCYTES # BLD: 0.7 K/UL (ref 0–1.3)
MONOCYTES NFR BLD: 11.2 %
NEUTROPHILS # BLD: 3.9 K/UL (ref 1.7–7.7)
NEUTROPHILS NFR BLD: 59.8 %
PLATELET # BLD AUTO: 243 K/UL (ref 135–450)
PMV BLD AUTO: 8.6 FL (ref 5–10.5)
POTASSIUM SERPL-SCNC: 4.3 MMOL/L (ref 3.5–5.1)
RBC # BLD AUTO: 5.69 M/UL (ref 4.2–5.9)
SODIUM SERPL-SCNC: 138 MMOL/L (ref 136–145)
WBC # BLD AUTO: 6.5 K/UL (ref 4–11)

## 2025-01-09 ENCOUNTER — TELEPHONE (OUTPATIENT)
Dept: CARDIOLOGY | Facility: CLINIC | Age: 60
End: 2025-01-09

## 2025-01-09 NOTE — TELEPHONE ENCOUNTER
Jesus at Dr. Tino Resendez's office is requesting a cardiac/EP clearance for the patient to have a traditional osseous surgery. She would like to know how long he needs to hold Aspirin and Pradaxa prior to?    (P)[phone]  (F)[phone]

## 2025-01-10 NOTE — TELEPHONE ENCOUNTER
[facility] Cardiology/ Electrophysiology   [address]  [place] [address]  PHONE: [phone]  FAX: [phone]      Date:01/10/25    To Whom It May Concern,    Rachid Jaramillo 1965 can  proceed for scheduled procedure/surgery from an EP standpoint. Any questions please call  [phone].       Continue Aspirin. Hold Pradaxa 2 days prior.             Sincerely,      Electronically signed by DARCI Choudhary, 01/10/25, 1:11 PM EST.

## 2025-01-20 DIAGNOSIS — I45.81 PROLONGED QT INTERVAL SYNDROME: ICD-10-CM

## 2025-01-20 RX ORDER — NADOLOL 20 MG/1
TABLET ORAL
Qty: 45 TABLET | Refills: 0 | Status: SHIPPED | OUTPATIENT
Start: 2025-01-20

## 2025-01-27 NOTE — TELEPHONE ENCOUNTER
[phone] (Mobile)  LM to call back to establish care with new provider--Juan vale       Future appt scheduled 0 appt scheduled                Last appt 11/21/2024 (pre Op)       Last Written 11/07/2024    ASPIRIN LOW DOSE 81 MG chewable tablet   #90  0 RF

## 2025-01-28 ENCOUNTER — OFFICE VISIT (OUTPATIENT)
Dept: FAMILY MEDICINE CLINIC | Age: 60
End: 2025-01-28
Payer: COMMERCIAL

## 2025-01-28 VITALS
SYSTOLIC BLOOD PRESSURE: 112 MMHG | DIASTOLIC BLOOD PRESSURE: 76 MMHG | OXYGEN SATURATION: 94 % | WEIGHT: 197.6 LBS | HEIGHT: 67 IN | HEART RATE: 81 BPM | BODY MASS INDEX: 31.01 KG/M2

## 2025-01-28 DIAGNOSIS — I25.10 CORONARY ARTERY DISEASE INVOLVING NATIVE CORONARY ARTERY OF NATIVE HEART WITHOUT ANGINA PECTORIS: ICD-10-CM

## 2025-01-28 DIAGNOSIS — I48.0 PAF (PAROXYSMAL ATRIAL FIBRILLATION) (HCC): Primary | ICD-10-CM

## 2025-01-28 DIAGNOSIS — R73.03 PRE-DIABETES: ICD-10-CM

## 2025-01-28 DIAGNOSIS — I45.81 PROLONGED QT INTERVAL SYNDROME: ICD-10-CM

## 2025-01-28 DIAGNOSIS — J06.9 VIRAL URI: ICD-10-CM

## 2025-01-28 PROCEDURE — 99214 OFFICE O/P EST MOD 30 MIN: CPT | Performed by: FAMILY MEDICINE

## 2025-01-28 RX ORDER — ASPIRIN 81 MG/1
81 TABLET, CHEWABLE ORAL DAILY
Qty: 30 TABLET | Refills: 11 | Status: SHIPPED | OUTPATIENT
Start: 2025-01-28

## 2025-01-28 RX ORDER — ATORVASTATIN CALCIUM 80 MG/1
80 TABLET, FILM COATED ORAL NIGHTLY
Qty: 30 TABLET | Refills: 11 | Status: SHIPPED | OUTPATIENT
Start: 2025-01-28

## 2025-01-28 RX ORDER — NADOLOL 20 MG/1
20 TABLET ORAL DAILY
Qty: 30 TABLET | Refills: 11 | Status: SHIPPED | OUTPATIENT
Start: 2025-01-28

## 2025-01-28 RX ORDER — ASPIRIN 81 MG
TABLET,CHEWABLE ORAL
Qty: 90 TABLET | Refills: 0 | OUTPATIENT
Start: 2025-01-28

## 2025-01-28 RX ORDER — DABIGATRAN ETEXILATE 150 MG/1
150 CAPSULE ORAL 2 TIMES DAILY
Qty: 60 CAPSULE | Refills: 11 | Status: SHIPPED | OUTPATIENT
Start: 2025-01-28

## 2025-01-28 SDOH — ECONOMIC STABILITY: FOOD INSECURITY: WITHIN THE PAST 12 MONTHS, YOU WORRIED THAT YOUR FOOD WOULD RUN OUT BEFORE YOU GOT MONEY TO BUY MORE.: NEVER TRUE

## 2025-01-28 SDOH — ECONOMIC STABILITY: FOOD INSECURITY: WITHIN THE PAST 12 MONTHS, THE FOOD YOU BOUGHT JUST DIDN'T LAST AND YOU DIDN'T HAVE MONEY TO GET MORE.: NEVER TRUE

## 2025-01-28 ASSESSMENT — PATIENT HEALTH QUESTIONNAIRE - PHQ9
SUM OF ALL RESPONSES TO PHQ9 QUESTIONS 1 & 2: 0
1. LITTLE INTEREST OR PLEASURE IN DOING THINGS: NOT AT ALL
SUM OF ALL RESPONSES TO PHQ QUESTIONS 1-9: 0
2. FEELING DOWN, DEPRESSED OR HOPELESS: NOT AT ALL

## 2025-01-28 NOTE — ASSESSMENT & PLAN NOTE
Stable continue aspirin atorvastatin.    Orders:    aspirin (ASPIRIN LOW DOSE) 81 MG chewable tablet; Take 1 tablet by mouth daily    atorvastatin (LIPITOR) 80 MG tablet; Take 1 tablet by mouth nightly at bedtime.

## 2025-01-28 NOTE — PROGRESS NOTES
Jayesh Bassett (:  1965) is a 59 y.o. male,Established patient, here for evaluation of the following chief complaint(s):  New Patient, Cough, and Hyperlipidemia         Assessment & Plan  PAF (paroxysmal atrial fibrillation) (HCC)    Stable.  Patient indicates he said no episodes of A-fib following his ablation.  Advised to contact cardiology regarding potential discontinuing of the Pradaxa.  However I will refill this today.    Orders:    dabigatran (PRADAXA) 150 MG capsule; Take 1 capsule by mouth 2 times daily    Prolonged QT interval syndrome    Continue nadolol.  Pacemaker defibrillator implanted in     Orders:    nadolol (CORGARD) 20 MG tablet; Take 1 tablet by mouth daily    Coronary artery disease involving native coronary artery of native heart without angina pectoris    Stable continue aspirin atorvastatin.    Orders:    aspirin (ASPIRIN LOW DOSE) 81 MG chewable tablet; Take 1 tablet by mouth daily    atorvastatin (LIPITOR) 80 MG tablet; Take 1 tablet by mouth nightly at bedtime.    Pre-diabetes    Stable continue diet and activity.         Viral URI    We discussed over-the-counter medications.  I do not have anything by prescription that we will shorten the duration of current symptoms.           Return in about 6 months (around 2025) for med check .       Subjective   HPI  59-year-old male here for a follow-up visit.  Reviewed medical surgical social family history.  He complains of a cough started yesterday.  Multiple sick contacts.  No fevers chills nausea vomiting diarrhea.  Review of Systems   As above    Objective   Physical Exam  HENT:      Head: Normocephalic and atraumatic.   Eyes:      Extraocular Movements: Extraocular movements intact.   Cardiovascular:      Rate and Rhythm: Normal rate and regular rhythm.   Pulmonary:      Effort: Pulmonary effort is normal.      Breath sounds: No wheezing, rhonchi or rales.   Musculoskeletal:      Cervical back: Neck supple.   Neurological:

## 2025-01-28 NOTE — ASSESSMENT & PLAN NOTE
Continue nadolol.  Pacemaker defibrillator implanted in 2022    Orders:    nadolol (CORGARD) 20 MG tablet; Take 1 tablet by mouth daily

## 2025-01-28 NOTE — ASSESSMENT & PLAN NOTE
Stable.  Patient indicates he said no episodes of A-fib following his ablation.  Advised to contact cardiology regarding potential discontinuing of the Pradaxa.  However I will refill this today.    Orders:    dabigatran (PRADAXA) 150 MG capsule; Take 1 capsule by mouth 2 times daily

## 2025-02-20 ENCOUNTER — OFFICE VISIT (OUTPATIENT)
Dept: CARDIOLOGY | Facility: CLINIC | Age: 60
End: 2025-02-20

## 2025-02-20 VITALS
OXYGEN SATURATION: 96 % | BODY MASS INDEX: 27.4 KG/M2 | WEIGHT: 185 LBS | SYSTOLIC BLOOD PRESSURE: 98 MMHG | HEIGHT: 69 IN | DIASTOLIC BLOOD PRESSURE: 68 MMHG | HEART RATE: 65 BPM

## 2025-02-20 DIAGNOSIS — I48.0 PAF (PAROXYSMAL ATRIAL FIBRILLATION): ICD-10-CM

## 2025-02-20 DIAGNOSIS — I25.10 CORONARY ARTERY DISEASE INVOLVING NATIVE CORONARY ARTERY OF NATIVE HEART WITHOUT ANGINA PECTORIS: Primary | ICD-10-CM

## 2025-02-20 DIAGNOSIS — I45.81 LONG Q-T SYNDROME: ICD-10-CM

## 2025-02-20 DIAGNOSIS — I47.10 SUSTAINED SVT: ICD-10-CM

## 2025-02-20 NOTE — PROGRESS NOTES
Rachid Jaramillo  1965  [phone]      02/20/2025    [facility] CARDIOLOGY     Provider, No Known  [place] [address]    Chief Complaint   Patient presents with    Long QT Syndrome       Problem List:      Long QT Syndrome  48 Hour Holter 6/25/18: 47 127 bpm, Average 77 bpm. Sinus Tachycarida 127 bpm. Rare PVCs. No symtpoms during monitor.   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)   [facility] 10/2019: no atrial fibrillation or VT/VF  Admission at  2/2022 for SVT:  [facility] 2/8/2022: LAD with proximal vessel lesion 60% stenosis, moderate disease in the proximal LAD, RFR and FFR negative, treated medically  Echocardiogram 2/10/2022 EF 50 to 55%, trivial pericardial effusion  [employer] DDD ICD implant 2/9/2022 due to episode of near syncope likely cardiogenic in origin, family history Long QT, and sinus bradycardia, complicated by postoperative pneumothorax requiring chest tube  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 Song Rogers MD at  2/16/2022  Paroxysmal atrial fibrillation  CHADSVasc = 1 (CAD)  New diagnosis found by device interrogation noted on 7/16/2020 for 24-hour episode-asymptomatic  Family History of LQTS Type 1 (sister)   Surgical History  inguinal hernia left repair    Allergies  No Known Allergies    Current Medications    Current Outpatient Medications:     aspirin 81 MG chewable tablet, Chew 1 tablet Daily., Disp: , Rfl:     atorvastatin (LIPITOR) 80 MG tablet, Take 1 tablet by mouth Daily., Disp: , Rfl:     dabigatran etexilate (PRADAXA) 150 MG capsu, Take 1 capsule by mouth 2 (Two) Times a Day. (Patient taking differently: Take 1 capsule by mouth Daily.), Disp: 60 capsule, Rfl: 6    multivitamin with minerals tablet tablet, Take 1 tablet by mouth Daily., Disp: , Rfl:     nadolol (CORGARD) 40 MG tablet, TAKE 1 TABLET BY MOUTH DAILY AT BEDTIME, Disp: 90  "tablet, Rfl: 3    History of Present Illness     Pt presents for follow up of LQTS, SVT, PAF. Since the pt has seen us, pt eats that he has chronic low blood pressure and for that reason he is lower his nadolol to 20 mg a day.  In addition on February 17, he had an episode that lasted approximately 2 days of feeling very tired and weak and his blood pressure was extremely low.  He was unaware of any tachypalpitations.  Since that time he has done reasonably well.  He denies any recent chest pain or shortness of breath episodes.  No ICD discharges.  He had stopped taking his Pradaxa as he was told by his physician that his ablation cured his atrial fibrillation.  Then he started taking the Pradaxa once a day.  Now he is going to start taking it twice a day.  No bleeding issues.  No TIAs.        Vitals:    02/20/25 1004   BP: 98/68   BP Location: Left arm   Patient Position: Sitting   Pulse: 65   SpO2: 96%   Weight: 83.9 kg (185 lb)   Height: 175.3 cm (69\")       PE:  General: NAD  Neck: no JVD, no carotid bruits, no TM  Heart RRR, NL S1, S2, S4 present, no rubs, murmurs  Lungs: CTA, no wheezes, rhonchi, or rales  Abd: soft, non-tender, NL BS  Ext: No musculoskeletal deformities, no edema, cyanosis, or clubbing  Psych: normal mood and affect    Diagnostic Data:    ECG 12 Lead    Date/Time: 2/20/2025 10:34 AM  Performed by: Anand Welsh MD    Authorized by: Anand Welsh MD  Comparison: compared with previous ECG from 8/15/2024  Similar to previous ECG  Rhythm: sinus rhythm  BPM: 66      .    ICD manual interrogation: Normal function RA paced 13% RV paced 0%, normal thresholds and impedances, 7.5 years on battery, 1 episode of atrial fibrillation lasting approximately 36 hours occurring on 2/17/2025    No diagnosis found.    Plan:  1.  Paroxysmal atrial fibrillation now with a second episode over the past year lasting 36 hours in duration extremely symptomatic during that time.  Encouraged the patient to go " back on Pradaxa twice a day.  Long discussion with him today.  Offered him antiarrhythmic therapy versus pulmonary vein ablation with PFA.  He does understand that antiarrhythmic therapy can worsen his low blood pressure.  We cannot go down lower on his nadolol for treatment of his long QT syndrome.  He is interested in pulmonary vein ablation.  He would like to think about it before he agrees it.  He will notify us by telephone.  Otherwise no other changes at this time.      2. Long QT syndrome: QTc stable today on EKG  - on Nadolol 20 mg daily.   - 2/2022: ICD.  Normal interrogation today     3.  AVNRT:  -S/p AVNRT ablation at  2/16/2022. No recurrences.     4. CAD: on lipitor, ASA, BBL  - [facility] 2022: 60% LAD, treated medically. Recent stress test normal, [facility].          F/up in 6 months

## 2025-07-17 LAB
MC_CV_MDC_IDC_RATE_1: 150
MC_CV_MDC_IDC_RATE_1: 171
MC_CV_MDC_IDC_RATE_1: 188
MC_CV_MDC_IDC_RATE_2: 167
MC_CV_MDC_IDC_RV_HV_IMPEDANCE: 81
MC_CV_MDC_IDC_THERAPIES: NORMAL
MC_CV_MDC_IDC_THERAPIES: NORMAL
MC_CV_MDC_IDC_ZONE_ID: 2
MC_CV_MDC_IDC_ZONE_ID: 3
MC_CV_MDC_IDC_ZONE_ID: 4
MC_CV_MDC_IDC_ZONE_ID: 5
MC_CV_MDC_IDC_ZONE_ID: 6
MDC_IDC_MSMT_BATTERY_REMAINING_LONGEVITY: 87 MO
MDC_IDC_MSMT_BATTERY_RRT_TRIGGER: 2.73
MDC_IDC_MSMT_BATTERY_STATUS: NORMAL
MDC_IDC_MSMT_BATTERY_VOLTAGE: 3
MDC_IDC_MSMT_CAP_CHARGE_TIME: 3.81
MDC_IDC_MSMT_LEADCHNL_RA_DTM: NORMAL
MDC_IDC_MSMT_LEADCHNL_RA_IMPEDANCE_VALUE: 456
MDC_IDC_MSMT_LEADCHNL_RA_PACING_THRESHOLD_AMPLITUDE: 1.38
MDC_IDC_MSMT_LEADCHNL_RA_PACING_THRESHOLD_POLARITY: NORMAL
MDC_IDC_MSMT_LEADCHNL_RA_PACING_THRESHOLD_PULSEWIDTH: 0.4
MDC_IDC_MSMT_LEADCHNL_RA_SENSING_INTR_AMPL: 2.25
MDC_IDC_MSMT_LEADCHNL_RV_DTM: NORMAL
MDC_IDC_MSMT_LEADCHNL_RV_IMPEDANCE_VALUE: 456
MDC_IDC_MSMT_LEADCHNL_RV_PACING_THRESHOLD_AMPLITUDE: 0.62
MDC_IDC_MSMT_LEADCHNL_RV_PACING_THRESHOLD_POLARITY: NORMAL
MDC_IDC_MSMT_LEADCHNL_RV_PACING_THRESHOLD_PULSEWIDTH: 0.4
MDC_IDC_MSMT_LEADCHNL_RV_SENSING_INTR_AMPL: 10.38
MDC_IDC_PG_IMPLANT_DTM: NORMAL
MDC_IDC_PG_MFG: NORMAL
MDC_IDC_PG_MODEL: NORMAL
MDC_IDC_PG_SERIAL: NORMAL
MDC_IDC_PG_TYPE: NORMAL
MDC_IDC_SESS_DTM: NORMAL
MDC_IDC_SESS_TYPE: NORMAL
MDC_IDC_SET_BRADY_AT_MODE_SWITCH_RATE: 171
MDC_IDC_SET_BRADY_LOWRATE: 50
MDC_IDC_SET_BRADY_MAX_SENSOR_RATE: 120
MDC_IDC_SET_BRADY_MAX_TRACKING_RATE: 130
MDC_IDC_SET_BRADY_MODE: NORMAL
MDC_IDC_SET_BRADY_PAV_DELAY: 180
MDC_IDC_SET_BRADY_SAV_DELAY: 150
MDC_IDC_SET_LEADCHNL_RA_PACING_AMPLITUDE: 2.75
MDC_IDC_SET_LEADCHNL_RA_PACING_POLARITY: NORMAL
MDC_IDC_SET_LEADCHNL_RA_PACING_PULSEWIDTH: 0.4
MDC_IDC_SET_LEADCHNL_RA_SENSING_POLARITY: NORMAL
MDC_IDC_SET_LEADCHNL_RA_SENSING_SENSITIVITY: 0.3
MDC_IDC_SET_LEADCHNL_RV_PACING_AMPLITUDE: 1.5
MDC_IDC_SET_LEADCHNL_RV_PACING_POLARITY: NORMAL
MDC_IDC_SET_LEADCHNL_RV_PACING_PULSEWIDTH: 0.4
MDC_IDC_SET_LEADCHNL_RV_SENSING_POLARITY: NORMAL
MDC_IDC_SET_LEADCHNL_RV_SENSING_SENSITIVITY: 0.3
MDC_IDC_SET_ZONE_STATUS: NORMAL
MDC_IDC_SET_ZONE_TYPE: NORMAL
MDC_IDC_STAT_AT_BURDEN_PERCENT: 0
MDC_IDC_STAT_BRADY_RA_PERCENT_PACED: 13.5
MDC_IDC_STAT_BRADY_RV_PERCENT_PACED: 0.06
MDC_IDC_STAT_TACHYTHERAPY_ATP_DELIVERED_RECENT: 0
MDC_IDC_STAT_TACHYTHERAPY_SHOCKS_ABORTED_RECENT: 0
MDC_IDC_STAT_TACHYTHERAPY_SHOCKS_DELIVERED_RECENT: 0

## 2025-07-21 ENCOUNTER — TELEPHONE (OUTPATIENT)
Dept: CARDIOLOGY | Facility: CLINIC | Age: 60
End: 2025-07-21

## 2025-07-29 NOTE — PROGRESS NOTES
Jayesh Bassett (:  1965) is a 60 y.o. male,Established patient, here for evaluation of the following chief complaint(s):  6 Month Follow-Up           Assessment & Plan  Coronary artery disease involving native coronary artery of native heart without angina pectoris   Able following up with cardiology.  Fasting blood work ordered.  Continue current medications    Orders:    Hepatic Function Panel; Future    Lipid Panel; Future    Basic Metabolic Panel; Future    CBC; Future    PAF (paroxysmal atrial fibrillation) (HCC)   As above             ICD-10-CM    1. Coronary artery disease involving native coronary artery of native heart without angina pectoris  I25.10 Hepatic Function Panel     Lipid Panel     Basic Metabolic Panel     CBC      2. PAF (paroxysmal atrial fibrillation) (HCC)  I48.0         Return in about 1 year (around 2026) for cad, afib..       Subjective   HPI  60-year-old male with history of coronary artery disease and atrial fibrillation here for follow-up.  No complaints at this time.  He has questions regarding his medication.  He is following up with cardiology.    Prior to Visit Medications    Medication Sig Taking? Authorizing Provider   aspirin (ASPIRIN LOW DOSE) 81 MG chewable tablet Take 1 tablet by mouth daily Yes John Dillon MD   atorvastatin (LIPITOR) 80 MG tablet Take 1 tablet by mouth nightly at bedtime. Yes John Dillon MD   nadolol (CORGARD) 20 MG tablet Take 1 tablet by mouth daily Yes John Dillon MD   dabigatran (PRADAXA) 150 MG capsule Take 1 capsule by mouth 2 times daily Yes John Dillon MD   Multiple Vitamins-Minerals (THERAPEUTIC MULTIVITAMIN-MINERALS) tablet Take 1 tablet by mouth daily Yes Doug Nolasco MD   NONFORMULARY daily Herbal for Prostate Health Yes Doug Nolasco MD      Review of Systems   Constitutional:  Negative for chills and fever.   Respiratory:  Negative for shortness of breath.    Cardiovascular:  Negative for chest pain.

## 2025-07-31 ENCOUNTER — OFFICE VISIT (OUTPATIENT)
Dept: FAMILY MEDICINE CLINIC | Age: 60
End: 2025-07-31
Payer: COMMERCIAL

## 2025-07-31 VITALS
BODY MASS INDEX: 30.07 KG/M2 | RESPIRATION RATE: 20 BRPM | SYSTOLIC BLOOD PRESSURE: 118 MMHG | HEART RATE: 80 BPM | OXYGEN SATURATION: 98 % | WEIGHT: 192 LBS | DIASTOLIC BLOOD PRESSURE: 76 MMHG

## 2025-07-31 DIAGNOSIS — I48.0 PAF (PAROXYSMAL ATRIAL FIBRILLATION) (HCC): ICD-10-CM

## 2025-07-31 DIAGNOSIS — I25.10 CORONARY ARTERY DISEASE INVOLVING NATIVE CORONARY ARTERY OF NATIVE HEART WITHOUT ANGINA PECTORIS: Primary | ICD-10-CM

## 2025-07-31 PROCEDURE — 99213 OFFICE O/P EST LOW 20 MIN: CPT | Performed by: FAMILY MEDICINE

## 2025-07-31 ASSESSMENT — ENCOUNTER SYMPTOMS
NAUSEA: 0
VOMITING: 0
SHORTNESS OF BREATH: 0
DIARRHEA: 0
CONSTIPATION: 0

## 2025-07-31 NOTE — ASSESSMENT & PLAN NOTE
Able following up with cardiology.  Fasting blood work ordered.  Continue current medications    Orders:    Hepatic Function Panel; Future    Lipid Panel; Future    Basic Metabolic Panel; Future    CBC; Future

## (undated) DEVICE — MEDI-VAC NON-CONDUCTIVE SUCTION TUBING: Brand: CARDINAL HEALTH

## (undated) DEVICE — Z DISCONTINUED USE 2624853 GLOVE SURG SZ 75 L12IN THK91MIL BRN LTX FREE

## (undated) DEVICE — HERNIA PK

## (undated) DEVICE — FILTER CLP DISP FOR 5513E CLIPVAC

## (undated) DEVICE — Z DISCONTINUED BY MEDLINE USE 2271199 TRAY PREP WET 4% CHG SCRB SOL

## (undated) DEVICE — GLOVE SURG SZ 65 L12IN FNGR THK87MIL WHT LTX FREE

## (undated) DEVICE — MEDI-VAC SUCTION HANDLE REGULAR CAPACITY: Brand: CARDINAL HEALTH